# Patient Record
Sex: MALE | Race: WHITE | NOT HISPANIC OR LATINO | Employment: UNEMPLOYED | URBAN - METROPOLITAN AREA
[De-identification: names, ages, dates, MRNs, and addresses within clinical notes are randomized per-mention and may not be internally consistent; named-entity substitution may affect disease eponyms.]

---

## 2017-08-14 ENCOUNTER — HISTORICAL (OUTPATIENT)
Dept: RADIOLOGY | Facility: HOSPITAL | Age: 22
End: 2017-08-14

## 2022-08-10 ENCOUNTER — HOSPITAL ENCOUNTER (EMERGENCY)
Facility: HOSPITAL | Age: 27
Discharge: HOME | End: 2022-08-11
Attending: INTERNAL MEDICINE
Payer: MEDICAID

## 2022-08-10 DIAGNOSIS — N20.1 URETEROLITHIASIS: Primary | ICD-10-CM

## 2022-08-10 DIAGNOSIS — N20.0 KIDNEY STONE: ICD-10-CM

## 2022-08-10 LAB
ALBUMIN SERPL-MCNC: 4.1 GM/DL (ref 3.5–5)
ALBUMIN/GLOB SERPL: 1.2 RATIO (ref 1.1–2)
ALP SERPL-CCNC: 76 UNIT/L (ref 40–150)
ALT SERPL-CCNC: 31 UNIT/L (ref 0–55)
APPEARANCE UR: ABNORMAL
AST SERPL-CCNC: 18 UNIT/L (ref 5–34)
BACTERIA #/AREA URNS AUTO: ABNORMAL /HPF
BASOPHILS # BLD AUTO: 0.04 X10(3)/MCL (ref 0–0.2)
BASOPHILS NFR BLD AUTO: 0.3 %
BILIRUB UR QL STRIP.AUTO: NEGATIVE MG/DL
BILIRUBIN DIRECT+TOT PNL SERPL-MCNC: 0.3 MG/DL
BUN SERPL-MCNC: 18.2 MG/DL (ref 8.9–20.6)
CALCIUM SERPL-MCNC: 9.6 MG/DL (ref 8.4–10.2)
CHLORIDE SERPL-SCNC: 103 MMOL/L (ref 98–107)
CO2 SERPL-SCNC: 25 MMOL/L (ref 22–29)
COLOR UR AUTO: YELLOW
CREAT SERPL-MCNC: 1.06 MG/DL (ref 0.73–1.18)
EOSINOPHIL # BLD AUTO: 0.11 X10(3)/MCL (ref 0–0.9)
EOSINOPHIL NFR BLD AUTO: 0.9 %
ERYTHROCYTE [DISTWIDTH] IN BLOOD BY AUTOMATED COUNT: 14.2 % (ref 11.5–17)
GFR SERPLBLD CREATININE-BSD FMLA CKD-EPI: >60 MLS/MIN/1.73/M2
GLOBULIN SER-MCNC: 3.4 GM/DL (ref 2.4–3.5)
GLUCOSE SERPL-MCNC: 104 MG/DL (ref 74–100)
GLUCOSE UR QL STRIP.AUTO: NORMAL MG/DL
HCT VFR BLD AUTO: 43.8 % (ref 42–52)
HGB BLD-MCNC: 13.6 GM/DL (ref 14–18)
HYALINE CASTS #/AREA URNS LPF: ABNORMAL /LPF
IMM GRANULOCYTES # BLD AUTO: 0.03 X10(3)/MCL (ref 0–0.04)
IMM GRANULOCYTES NFR BLD AUTO: 0.2 %
KETONES UR QL STRIP.AUTO: ABNORMAL MG/DL
LEUKOCYTE ESTERASE UR QL STRIP.AUTO: NEGATIVE UNIT/L
LYMPHOCYTES # BLD AUTO: 3.45 X10(3)/MCL (ref 0.6–4.6)
LYMPHOCYTES NFR BLD AUTO: 28.3 %
MCH RBC QN AUTO: 26.5 PG (ref 27–31)
MCHC RBC AUTO-ENTMCNC: 31.1 MG/DL (ref 33–36)
MCV RBC AUTO: 85.4 FL (ref 80–94)
MONOCYTES # BLD AUTO: 1.1 X10(3)/MCL (ref 0.1–1.3)
MONOCYTES NFR BLD AUTO: 9 %
MUCOUS THREADS URNS QL MICRO: ABNORMAL /LPF
NEUTROPHILS # BLD AUTO: 7.4 X10(3)/MCL (ref 2.1–9.2)
NEUTROPHILS NFR BLD AUTO: 61.3 %
NITRITE UR QL STRIP.AUTO: NEGATIVE
NRBC BLD AUTO-RTO: 0 %
PH UR STRIP.AUTO: 5.5 [PH]
PLATELET # BLD AUTO: 376 X10(3)/MCL (ref 130–400)
PMV BLD AUTO: 11.1 FL (ref 7.4–10.4)
POTASSIUM SERPL-SCNC: 4 MMOL/L (ref 3.5–5.1)
PROT SERPL-MCNC: 7.5 GM/DL (ref 6.4–8.3)
PROT UR QL STRIP.AUTO: ABNORMAL MG/DL
RBC # BLD AUTO: 5.13 X10(6)/MCL (ref 4.7–6.1)
RBC UR QL AUTO: ABNORMAL UNIT/L
SODIUM SERPL-SCNC: 143 MMOL/L (ref 136–145)
SP GR UR STRIP.AUTO: 1.03
SQUAMOUS #/AREA URNS LPF: ABNORMAL /HPF
UNIDENT CRYS #/AREA URNS HPF: ABNORMAL /HPF
UROBILINOGEN UR STRIP-ACNC: NORMAL MG/DL
WBC # SPEC AUTO: 12.2 X10(3)/MCL (ref 4.5–11.5)
WBC #/AREA URNS AUTO: ABNORMAL /HPF

## 2022-08-10 PROCEDURE — 25000003 PHARM REV CODE 250: Performed by: INTERNAL MEDICINE

## 2022-08-10 PROCEDURE — 96365 THER/PROPH/DIAG IV INF INIT: CPT

## 2022-08-10 PROCEDURE — 63600175 PHARM REV CODE 636 W HCPCS: Performed by: INTERNAL MEDICINE

## 2022-08-10 PROCEDURE — 36415 COLL VENOUS BLD VENIPUNCTURE: CPT | Performed by: INTERNAL MEDICINE

## 2022-08-10 PROCEDURE — 85025 COMPLETE CBC W/AUTO DIFF WBC: CPT | Performed by: INTERNAL MEDICINE

## 2022-08-10 PROCEDURE — 99285 EMERGENCY DEPT VISIT HI MDM: CPT | Mod: 25

## 2022-08-10 PROCEDURE — 80053 COMPREHEN METABOLIC PANEL: CPT | Performed by: INTERNAL MEDICINE

## 2022-08-10 PROCEDURE — 81001 URINALYSIS AUTO W/SCOPE: CPT | Performed by: PHYSICIAN ASSISTANT

## 2022-08-10 PROCEDURE — 96361 HYDRATE IV INFUSION ADD-ON: CPT

## 2022-08-10 PROCEDURE — 96375 TX/PRO/DX INJ NEW DRUG ADDON: CPT

## 2022-08-10 RX ORDER — KETOROLAC TROMETHAMINE 30 MG/ML
30 INJECTION, SOLUTION INTRAMUSCULAR; INTRAVENOUS
Status: COMPLETED | OUTPATIENT
Start: 2022-08-10 | End: 2022-08-10

## 2022-08-10 RX ORDER — KETOROLAC TROMETHAMINE 30 MG/ML
30 INJECTION, SOLUTION INTRAMUSCULAR; INTRAVENOUS
Status: DISCONTINUED | OUTPATIENT
Start: 2022-08-10 | End: 2022-08-10

## 2022-08-10 RX ORDER — MORPHINE SULFATE 2 MG/ML
4 INJECTION, SOLUTION INTRAMUSCULAR; INTRAVENOUS
Status: COMPLETED | OUTPATIENT
Start: 2022-08-11 | End: 2022-08-10

## 2022-08-10 RX ORDER — TAMSULOSIN HYDROCHLORIDE 0.4 MG/1
0.4 CAPSULE ORAL
Status: COMPLETED | OUTPATIENT
Start: 2022-08-10 | End: 2022-08-10

## 2022-08-10 RX ADMIN — TAMSULOSIN HYDROCHLORIDE 0.4 MG: 0.4 CAPSULE ORAL at 10:08

## 2022-08-10 RX ADMIN — KETOROLAC TROMETHAMINE 30 MG: 30 INJECTION, SOLUTION INTRAMUSCULAR; INTRAVENOUS at 10:08

## 2022-08-10 RX ADMIN — MORPHINE SULFATE 4 MG: 2 INJECTION, SOLUTION INTRAMUSCULAR; INTRAVENOUS at 11:08

## 2022-08-10 RX ADMIN — SODIUM CHLORIDE, POTASSIUM CHLORIDE, SODIUM LACTATE AND CALCIUM CHLORIDE 1000 ML: 600; 310; 30; 20 INJECTION, SOLUTION INTRAVENOUS at 10:08

## 2022-08-10 RX ADMIN — PROMETHAZINE HYDROCHLORIDE 25 MG: 25 INJECTION INTRAMUSCULAR; INTRAVENOUS at 11:08

## 2022-08-11 VITALS
SYSTOLIC BLOOD PRESSURE: 125 MMHG | DIASTOLIC BLOOD PRESSURE: 89 MMHG | RESPIRATION RATE: 18 BRPM | OXYGEN SATURATION: 99 % | HEIGHT: 68 IN | TEMPERATURE: 98 F | WEIGHT: 304.25 LBS | BODY MASS INDEX: 46.11 KG/M2 | HEART RATE: 74 BPM

## 2022-08-11 PROCEDURE — 63600175 PHARM REV CODE 636 W HCPCS: Performed by: INTERNAL MEDICINE

## 2022-08-11 RX ORDER — HYDROCODONE BITARTRATE AND ACETAMINOPHEN 5; 325 MG/1; MG/1
1 TABLET ORAL EVERY 6 HOURS PRN
Qty: 12 TABLET | Refills: 0 | Status: SHIPPED | OUTPATIENT
Start: 2022-08-11 | End: 2023-03-17 | Stop reason: ALTCHOICE

## 2022-08-11 RX ORDER — TAMSULOSIN HYDROCHLORIDE 0.4 MG/1
0.4 CAPSULE ORAL DAILY
Qty: 14 CAPSULE | Refills: 0 | Status: SHIPPED | OUTPATIENT
Start: 2022-08-11 | End: 2023-03-17 | Stop reason: ALTCHOICE

## 2022-08-11 RX ORDER — SODIUM CHLORIDE, SODIUM LACTATE, POTASSIUM CHLORIDE, CALCIUM CHLORIDE 600; 310; 30; 20 MG/100ML; MG/100ML; MG/100ML; MG/100ML
500 INJECTION, SOLUTION INTRAVENOUS
Status: COMPLETED | OUTPATIENT
Start: 2022-08-11 | End: 2022-08-11

## 2022-08-11 RX ORDER — CIPROFLOXACIN 500 MG/1
500 TABLET ORAL 2 TIMES DAILY
Qty: 10 TABLET | Refills: 0 | Status: SHIPPED | OUTPATIENT
Start: 2022-08-11 | End: 2022-08-16

## 2022-08-11 RX ORDER — ONDANSETRON 4 MG/1
4 TABLET, ORALLY DISINTEGRATING ORAL EVERY 6 HOURS PRN
Qty: 15 TABLET | Refills: 0 | Status: SHIPPED | OUTPATIENT
Start: 2022-08-11 | End: 2023-03-17 | Stop reason: ALTCHOICE

## 2022-08-11 RX ORDER — MORPHINE SULFATE 2 MG/ML
4 INJECTION, SOLUTION INTRAMUSCULAR; INTRAVENOUS
Status: COMPLETED | OUTPATIENT
Start: 2022-08-11 | End: 2022-08-11

## 2022-08-11 RX ADMIN — MORPHINE SULFATE 4 MG: 2 INJECTION, SOLUTION INTRAMUSCULAR; INTRAVENOUS at 12:08

## 2022-08-11 RX ADMIN — SODIUM CHLORIDE, POTASSIUM CHLORIDE, SODIUM LACTATE AND CALCIUM CHLORIDE 500 ML: 600; 310; 30; 20 INJECTION, SOLUTION INTRAVENOUS at 12:08

## 2022-08-11 NOTE — ED PROVIDER NOTES
Encounter Date: 8/10/2022       History     Chief Complaint   Patient presents with    Flank Pain     Pt reports urinating last night 08/09/22, around 2100 hrs with sudden rt flank pain that is worsening. vss     Presents with Rt flank pain for the last 2 days. States initially he though was a muscular back pain but is worsening, now with nausea, vomiting and urinary hesitancy. Pain is sharp, moderate to severe, in Rt flank radiating to RLQ and back, associated to nausea and vomiting. Pt denies fever, hematuria, dysuria or prior episodes.    The history is provided by the patient.   Abdominal Pain  The current episode started two days ago. The onset of the illness was abrupt. The abdominal pain is located in the right flank. The abdominal pain radiates to the right flank, RLQ and back. The severity of the abdominal pain is 9/10. The abdominal pain is relieved by nothing. The other symptoms of the illness include nausea and vomiting. The other symptoms of the illness do not include fever, jaundice, shortness of breath, diarrhea or dysuria.   The patient has not had a change in bowel habit. Additional symptoms associated with the illness include back pain. Symptoms associated with the illness do not include diaphoresis, urgency or hematuria. Significant associated medical issues do not include GERD, inflammatory bowel disease, diabetes, gallstones or liver disease.     Review of patient's allergies indicates:   Allergen Reactions    Penicillins Rash     No past medical history on file.  No past surgical history on file.  No family history on file.  Social History     Tobacco Use    Smoking status: Current Every Day Smoker    Smokeless tobacco: Never Used     Review of Systems   Constitutional: Negative for diaphoresis and fever.   HENT: Negative for sore throat.    Respiratory: Negative for shortness of breath.    Cardiovascular: Negative for chest pain.   Gastrointestinal: Positive for abdominal pain, nausea and  vomiting. Negative for diarrhea and jaundice.   Genitourinary: Negative for dysuria, hematuria and urgency.   Musculoskeletal: Positive for back pain.   Skin: Negative for rash.   Neurological: Negative for weakness.   Hematological: Does not bruise/bleed easily.   All other systems reviewed and are negative.      Physical Exam     Initial Vitals [08/10/22 2205]   BP Pulse Resp Temp SpO2   (!) 130/96 81 (!) 22 98.3 °F (36.8 °C) 97 %      MAP       --         Physical Exam    Nursing note and vitals reviewed.  Constitutional: He appears well-developed and well-nourished. He appears distressed (In pain).   HENT:   Head: Normocephalic and atraumatic.   Mouth/Throat: Oropharynx is clear and moist.   Eyes: Conjunctivae are normal. Pupils are equal, round, and reactive to light.   Neck: Neck supple.   Normal range of motion.  Cardiovascular: Regular rhythm, normal heart sounds and intact distal pulses.   Pulmonary/Chest: Breath sounds normal. No respiratory distress.   Abdominal: Abdomen is soft. Bowel sounds are normal. He exhibits no distension. There is no abdominal tenderness.   Negative Gomez, negative McBurney There is no rebound and no guarding.   Genitourinary:    Genitourinary Comments: CVT Rt side     Musculoskeletal:         General: No edema. Normal range of motion.      Cervical back: Normal range of motion and neck supple.     Neurological: He is alert and oriented to person, place, and time. He has normal strength. GCS score is 15. GCS eye subscore is 4. GCS verbal subscore is 5. GCS motor subscore is 6.   Skin: Skin is warm and dry. No rash noted.   Psychiatric: His behavior is normal.         ED Course   ED US LTD Renal/Bladder    Date/Time: 8/10/2022 10:35 PM  Performed by: Juan Mary MD  Authorized by: Juan Mary MD     Indication:  Flank pain  Identified Structures:  Right kidney transverse and Right kidney longitudinal  Hydronephrosis:  Present  degree of  hydronephrosis:  Mild  Impression:  Hydronephrosis  Charge?:  No (educational)      Labs Reviewed   COMPREHENSIVE METABOLIC PANEL - Abnormal; Notable for the following components:       Result Value    Glucose Level 104 (*)     All other components within normal limits   CBC WITH DIFFERENTIAL - Abnormal; Notable for the following components:    WBC 12.2 (*)     Hgb 13.6 (*)     MCH 26.5 (*)     MCHC 31.1 (*)     MPV 11.1 (*)     All other components within normal limits   URINALYSIS, REFLEX TO URINE CULTURE - Abnormal; Notable for the following components:    Appearance, UA Turbid (*)     Protein, UA 1+ (*)     Ketones, UA Trace (*)     Blood, UA 3+ (*)     WBC, UA 6-10 (*)     Squamous Epithelial Cells, UA Trace (*)     Mucous, UA Many (*)     Unclassified Crystal, UA Occ (*)     All other components within normal limits   CBC W/ AUTO DIFFERENTIAL    Narrative:     The following orders were created for panel order CBC auto differential.  Procedure                               Abnormality         Status                     ---------                               -----------         ------                     CBC with Differential[[id number]]        Abnormal            Final result                 Please view results for these tests on the individual orders.          Imaging Results          CT Abdomen Pelvis  Without Contrast (Preliminary result)  Result time 08/10/22 23:26:37    Preliminary result by Interface, Rad Results In (08/10/22 23:26:37)                 Narrative:    START OF REPORT:  Technique: CT of the abdomen and pelvis was performed with axial images as well as sagittal and coronal reconstruction images without intravenous contrast.    Comparison: Comparison is with study dated. 2017-06-15 05:24:30.    Clinical History: Pt reports urinating last night 08/09/22, around 2100 hrs with sudden rt flank pain that is worsening. vss.    Dosage Information: Automated Exposure Control was  utilized.    Findings:  Lines and Tubes: None.  Thorax:  Lungs: The visualized lung bases appear unremarkable.  Pleura: No effusions or thickening. No pneumothorax is seen.  Heart: The heart size is within normal limits.  Abdomen:  Abdominal Wall: No abdominal wall pathology is seen.  Liver: The liver appears unremarkable.  Biliary System: No intrahepatic or extrahepatic biliary duct dilatation is seen.  Gallbladder: The gallbladder appears unremarkable.  Pancreas: The pancreas appears unremarkable.  Spleen: The spleen appears unremarkable.  Adrenals: The adrenal glands appear unremarkable.  Kidneys: The left kidney appears unremarkable with no stones cysts masses or hydronephrosis. A calculus measuring 3.4 mm is seen in the right proximal ureter (Series 2 image 53) causing mild hydroureteronephrosis.  Bowel:  Esophagus: The visualized esophagus appears unremarkable.  Stomach: The stomach appears unremarkable.  Duodenum: Unremarkable appearing duodenum.  Small Bowel: The small bowel appears unremarkable.  Colon: Nondistended.  Appendix: The appendix appears unremarkable.  Peritoneum: No intraperitoneal free air or ascites is seen.    Pelvis:  Bladder: The bladder is nondistended and cannot be definitively evaluated.  Male:  Prostate gland: The prostate gland appears unremarkable.    Bony structures:  Dorsal Spine: The visualized dorsal spine appears unremarkable.  Bony Pelvis: The visualized bony structures of the pelvis appear unremarkable.      Impression:  1. A calculus measuring 3.4 mm is seen in the right proximal ureter (Series 2 image 53) causing mild hydroureteronephrosis.                      Preliminary result by Liam Courtney Jr., MD (08/10/22 23:26:37)                 Narrative:    START OF REPORT:  Technique: CT of the abdomen and pelvis was performed with axial images as well as sagittal and coronal reconstruction images without intravenous contrast.    Comparison: Comparison is with study  dated. 2017-06-15 05:24:30.    Clinical History: Pt reports urinating last night 08/09/22, around 2100 hrs with sudden rt flank pain that is worsening. vss.    Dosage Information: Automated Exposure Control was utilized.    Findings:  Lines and Tubes: None.  Thorax:  Lungs: The visualized lung bases appear unremarkable.  Pleura: No effusions or thickening. No pneumothorax is seen.  Heart: The heart size is within normal limits.  Abdomen:  Abdominal Wall: No abdominal wall pathology is seen.  Liver: The liver appears unremarkable.  Biliary System: No intrahepatic or extrahepatic biliary duct dilatation is seen.  Gallbladder: The gallbladder appears unremarkable.  Pancreas: The pancreas appears unremarkable.  Spleen: The spleen appears unremarkable.  Adrenals: The adrenal glands appear unremarkable.  Kidneys: The left kidney appears unremarkable with no stones cysts masses or hydronephrosis. A calculus measuring 3.4 mm is seen in the right proximal ureter (Series 2 image 53) causing mild hydroureteronephrosis.  Bowel:  Esophagus: The visualized esophagus appears unremarkable.  Stomach: The stomach appears unremarkable.  Duodenum: Unremarkable appearing duodenum.  Small Bowel: The small bowel appears unremarkable.  Colon: Nondistended.  Appendix: The appendix appears unremarkable.  Peritoneum: No intraperitoneal free air or ascites is seen.    Pelvis:  Bladder: The bladder is nondistended and cannot be definitively evaluated.  Male:  Prostate gland: The prostate gland appears unremarkable.    Bony structures:  Dorsal Spine: The visualized dorsal spine appears unremarkable.  Bony Pelvis: The visualized bony structures of the pelvis appear unremarkable.      Impression:  1. A calculus measuring 3.4 mm is seen in the right proximal ureter (Series 2 image 53) causing mild hydroureteronephrosis.                                   Medications   lactated ringers bolus 1,000 mL (0 mLs Intravenous Stopped 8/10/22 2361)    ketorolac injection 30 mg (30 mg Intravenous Given 8/10/22 2255)   tamsulosin 24 hr capsule 0.4 mg (0.4 mg Oral Given 8/10/22 2256)   promethazine (PHENERGAN) 25 mg in dextrose 5 % 50 mL IVPB (0 mg Intravenous Stopped 8/11/22 0017)   morphine injection 4 mg (4 mg Intravenous Given 8/10/22 2359)   lactated ringers infusion (500 mLs Intravenous New Bag 8/11/22 0025)   morphine injection 4 mg (4 mg Intravenous Given 8/11/22 0026)          Reassessed pt at this time. Pt states his condition has improved at this time. Discussed with pt all pertinent ED information and results. Discussed pt dx of ureteral stone Rt side and plan of tx. Gave pt all f/u and return to the ED instructions. All questions and concerns were addressed at this time. Pt expresses understanding of information and instructions, and is comfortable with plan to discharge. Pt is stable for discharge.                      Clinical Impression:   Final diagnoses:  [N20.1] Ureterolithiasis (Primary)  [N20.0] Kidney stone          ED Disposition Condition    Discharge Stable        ED Prescriptions     Medication Sig Dispense Start Date End Date Auth. Provider    tamsulosin (FLOMAX) 0.4 mg Cap Take 1 capsule (0.4 mg total) by mouth once daily. for 14 days 14 capsule 8/11/2022 8/25/2022 Juan Mary MD    ondansetron (ZOFRAN-ODT) 4 MG TbDL Take 1 tablet (4 mg total) by mouth every 6 (six) hours as needed (nausea or vomiting). 15 tablet 8/11/2022  Juan Mary MD    ciprofloxacin HCl (CIPRO) 500 MG tablet Take 1 tablet (500 mg total) by mouth 2 (two) times daily. for 5 days 10 tablet 8/11/2022 8/16/2022 Juan Mary MD    HYDROcodone-acetaminophen (NORCO) 5-325 mg per tablet Take 1 tablet by mouth every 6 (six) hours as needed for Pain. 12 tablet 8/11/2022  Juan Mary MD        Follow-up Information     Follow up With Specialties Details Why Contact Info    [facility] Emergency Dept  Emergency Medicine  If symptoms worsen [address] [facility] [address] [phone]    [facility]  In 3 months  [address] [id number] [phone]    [facility] Urology Urology In 1 month  [address] [facility] [id number] [phone]           Juan Mary MD  08/11/22 0200

## 2022-09-26 ENCOUNTER — OFFICE VISIT (OUTPATIENT)
Dept: UROLOGY | Facility: CLINIC | Age: 27
End: 2022-09-26
Payer: MEDICAID

## 2022-09-26 VITALS
SYSTOLIC BLOOD PRESSURE: 122 MMHG | OXYGEN SATURATION: 98 % | HEIGHT: 68 IN | WEIGHT: 304.25 LBS | DIASTOLIC BLOOD PRESSURE: 85 MMHG | BODY MASS INDEX: 46.11 KG/M2 | RESPIRATION RATE: 18 BRPM | HEART RATE: 62 BPM | TEMPERATURE: 98 F

## 2022-09-26 DIAGNOSIS — N20.1 URETEROLITHIASIS: ICD-10-CM

## 2022-09-26 PROCEDURE — 3008F BODY MASS INDEX DOCD: CPT | Mod: CPTII,,, | Performed by: NURSE PRACTITIONER

## 2022-09-26 PROCEDURE — 1159F MED LIST DOCD IN RCRD: CPT | Mod: CPTII,,, | Performed by: NURSE PRACTITIONER

## 2022-09-26 PROCEDURE — 99214 OFFICE O/P EST MOD 30 MIN: CPT | Mod: PBBFAC | Performed by: NURSE PRACTITIONER

## 2022-09-26 PROCEDURE — 1160F RVW MEDS BY RX/DR IN RCRD: CPT | Mod: CPTII,,, | Performed by: NURSE PRACTITIONER

## 2022-09-26 PROCEDURE — 3079F DIAST BP 80-89 MM HG: CPT | Mod: CPTII,,, | Performed by: NURSE PRACTITIONER

## 2022-09-26 PROCEDURE — 3074F PR MOST RECENT SYSTOLIC BLOOD PRESSURE < 130 MM HG: ICD-10-PCS | Mod: CPTII,,, | Performed by: NURSE PRACTITIONER

## 2022-09-26 PROCEDURE — 3074F SYST BP LT 130 MM HG: CPT | Mod: CPTII,,, | Performed by: NURSE PRACTITIONER

## 2022-09-26 PROCEDURE — 99214 PR OFFICE/OUTPT VISIT, EST, LEVL IV, 30-39 MIN: ICD-10-PCS | Mod: S$PBB,,, | Performed by: NURSE PRACTITIONER

## 2022-09-26 PROCEDURE — 3008F PR BODY MASS INDEX (BMI) DOCUMENTED: ICD-10-PCS | Mod: CPTII,,, | Performed by: NURSE PRACTITIONER

## 2022-09-26 PROCEDURE — 99214 OFFICE O/P EST MOD 30 MIN: CPT | Mod: S$PBB,,, | Performed by: NURSE PRACTITIONER

## 2022-09-26 PROCEDURE — 1159F PR MEDICATION LIST DOCUMENTED IN MEDICAL RECORD: ICD-10-PCS | Mod: CPTII,,, | Performed by: NURSE PRACTITIONER

## 2022-09-26 PROCEDURE — 1160F PR REVIEW ALL MEDS BY PRESCRIBER/CLIN PHARMACIST DOCUMENTED: ICD-10-PCS | Mod: CPTII,,, | Performed by: NURSE PRACTITIONER

## 2022-09-26 PROCEDURE — 3079F PR MOST RECENT DIASTOLIC BLOOD PRESSURE 80-89 MM HG: ICD-10-PCS | Mod: CPTII,,, | Performed by: NURSE PRACTITIONER

## 2022-09-26 NOTE — PROGRESS NOTES
Pt seen by Raad NEGRON. Orders will be placed for US to be done. RTC 6 months with ADRIANE. Pt education given both written and verbal.

## 2022-09-26 NOTE — PROGRESS NOTES
Chief Complaint:   Chief Complaint   Patient presents with    Referral 08/11/2022 for Ureterolithiasis       HPI:  Patient is a 27-year-old male referred to Urology for kidney stones.  Patient seen in the ED on 08/10/2022 for right flank pain.  Patient states he passed stone but did not keep to send for analysis.  Patient denies flank pain, groin pain, lower abdominal pain, urinary frequency, urgency, incontinence, retention, dysuria, gross hematuria.  Denies family history of stones, or urology pathology.     .      Allergies:  Review of patient's allergies indicates:   Allergen Reactions    Penicillins Rash       Medications:  Current Outpatient Medications   Medication Sig Dispense Refill    HYDROcodone-acetaminophen (NORCO) 5-325 mg per tablet Take 1 tablet by mouth every 6 (six) hours as needed for Pain. (Patient not taking: Reported on 9/26/2022) 12 tablet 0    ondansetron (ZOFRAN-ODT) 4 MG TbDL Take 1 tablet (4 mg total) by mouth every 6 (six) hours as needed (nausea or vomiting). (Patient not taking: Reported on 9/26/2022) 15 tablet 0    tamsulosin (FLOMAX) 0.4 mg Cap Take 1 capsule (0.4 mg total) by mouth once daily. for 14 days 14 capsule 0     No current facility-administered medications for this visit.       Review of Systems:  General: No fever, chills, fatigability, or weight loss.  Skin: No rashes, itching, or changes in color or texture of skin.  Chest: Denies HERNANDES, cyanosis, wheezing, cough, and sputum production.  Abdomen: Appetite fine. No weight loss. Denies diarrhea, abdominal pain, hematemesis, or blood in stool.  Musculoskeletal: No joint stiffness or swelling. Denies back pain.  : As above.  All other review of systems negative.    PMH:  Past Medical History:   Diagnosis Date    Asthma     Heart murmur        PSH:  No past surgical history on file.    FamHx:  No family history on file.    SocHx:  Social History     Socioeconomic History    Marital status: Single   Tobacco Use    Smoking  status: Never    Smokeless tobacco: Never       Physical Exam:  Vitals:    09/26/22 1115   BP: 122/85   Pulse: 62   Resp: 18   Temp: 98.4 °F (36.9 °C)     General: A&Ox3, no apparent distress, no deformities  Neck: No masses, normal thyroid  Lungs: CTA geovany, no use of accessory muscles  Heart: RRR, no arrhythmias  Abdomen: Soft, NT, ND, no masses, no hernias, no hepatosplenomegaly  Lymphatic: Neck and groin nodes negative  Skin: The skin is warm and dry. No jaundice.  Ext: No c/c/e.            Imaging: CT Abd.Pelvis 08/10/2022: A calculus measuring 3.4 mm is seen in the right proximal ureter (Series 2 image 53) causing mild hydroureteronephrosis.      Impression: Utererolithiasis      Plan: RTC 6 months with a KUB, Renal US now to evaluate Hydronephrosis, will notify patient of results. . Increase fluid intake, limit salt in diet, limit protein to 30%, intake adequate calcium, increase brand, soy, Beets, nuts. Instructed patient on Avoiding bladder irritants, such as alcohol, citrus drinks or foods,salty foods, energy drinks, spicy foods, caffeine, sodas

## 2022-12-16 ENCOUNTER — HOSPITAL ENCOUNTER (OUTPATIENT)
Dept: RADIOLOGY | Facility: HOSPITAL | Age: 27
Discharge: HOME | End: 2022-12-16
Attending: NURSE PRACTITIONER
Payer: MEDICAID

## 2022-12-16 DIAGNOSIS — N20.1 URETEROLITHIASIS: Primary | ICD-10-CM

## 2022-12-16 DIAGNOSIS — N20.0 KIDNEY STONES: ICD-10-CM

## 2022-12-16 DIAGNOSIS — N20.1 URETEROLITHIASIS: ICD-10-CM

## 2022-12-16 PROCEDURE — 74018 RADEX ABDOMEN 1 VIEW: CPT | Mod: TC

## 2023-03-17 ENCOUNTER — HOSPITAL ENCOUNTER (EMERGENCY)
Facility: HOSPITAL | Age: 28
Discharge: HOME | End: 2023-03-17
Attending: FAMILY MEDICINE
Payer: MEDICAID

## 2023-03-17 VITALS
HEART RATE: 73 BPM | DIASTOLIC BLOOD PRESSURE: 94 MMHG | TEMPERATURE: 99 F | BODY MASS INDEX: 49.6 KG/M2 | HEIGHT: 66 IN | OXYGEN SATURATION: 98 % | SYSTOLIC BLOOD PRESSURE: 158 MMHG | WEIGHT: 308.63 LBS | RESPIRATION RATE: 20 BRPM

## 2023-03-17 DIAGNOSIS — K29.00 ACUTE GASTRITIS WITHOUT HEMORRHAGE, UNSPECIFIED GASTRITIS TYPE: Primary | ICD-10-CM

## 2023-03-17 LAB
ALBUMIN SERPL-MCNC: 4.4 G/DL (ref 3.5–5)
ALBUMIN/GLOB SERPL: 1.1 RATIO (ref 1.1–2)
ALP SERPL-CCNC: 76 UNIT/L (ref 40–150)
ALT SERPL-CCNC: 27 UNIT/L (ref 0–55)
AST SERPL-CCNC: 19 UNIT/L (ref 5–34)
BASOPHILS # BLD AUTO: 0.04 X10(3)/MCL (ref 0–0.2)
BASOPHILS NFR BLD AUTO: 0.3 %
BILIRUBIN DIRECT+TOT PNL SERPL-MCNC: 0.3 MG/DL
BUN SERPL-MCNC: 15.2 MG/DL (ref 8.9–20.6)
CALCIUM SERPL-MCNC: 10.2 MG/DL (ref 8.4–10.2)
CHLORIDE SERPL-SCNC: 101 MMOL/L (ref 98–107)
CO2 SERPL-SCNC: 26 MMOL/L (ref 22–29)
CREAT SERPL-MCNC: 1.12 MG/DL (ref 0.73–1.18)
EOSINOPHIL # BLD AUTO: 0.05 X10(3)/MCL (ref 0–0.9)
EOSINOPHIL NFR BLD AUTO: 0.4 %
ERYTHROCYTE [DISTWIDTH] IN BLOOD BY AUTOMATED COUNT: 14.6 % (ref 11.5–17)
GFR SERPLBLD CREATININE-BSD FMLA CKD-EPI: >60 MLS/MIN/1.73/M2
GLOBULIN SER-MCNC: 3.9 GM/DL (ref 2.4–3.5)
GLUCOSE SERPL-MCNC: 94 MG/DL (ref 74–100)
HCT VFR BLD AUTO: 43.9 % (ref 42–52)
HGB BLD-MCNC: 14.1 G/DL (ref 14–18)
IMM GRANULOCYTES # BLD AUTO: 0.04 X10(3)/MCL (ref 0–0.04)
IMM GRANULOCYTES NFR BLD AUTO: 0.3 %
LIPASE SERPL-CCNC: 15 U/L
LYMPHOCYTES # BLD AUTO: 1.9 X10(3)/MCL (ref 0.6–4.6)
LYMPHOCYTES NFR BLD AUTO: 16.3 %
MAGNESIUM SERPL-MCNC: 2.1 MG/DL (ref 1.6–2.6)
MCH RBC QN AUTO: 26.8 PG
MCHC RBC AUTO-ENTMCNC: 32.1 G/DL (ref 33–36)
MCV RBC AUTO: 83.3 FL (ref 80–94)
MONOCYTES # BLD AUTO: 0.81 X10(3)/MCL (ref 0.1–1.3)
MONOCYTES NFR BLD AUTO: 6.9 %
NEUTROPHILS # BLD AUTO: 8.84 X10(3)/MCL (ref 2.1–9.2)
NEUTROPHILS NFR BLD AUTO: 75.8 %
NRBC BLD AUTO-RTO: 0 %
PLATELET # BLD AUTO: 351 X10(3)/MCL (ref 130–400)
PMV BLD AUTO: 11.2 FL (ref 7.4–10.4)
POTASSIUM SERPL-SCNC: 4 MMOL/L (ref 3.5–5.1)
PROT SERPL-MCNC: 8.3 GM/DL (ref 6.4–8.3)
RBC # BLD AUTO: 5.27 X10(6)/MCL (ref 4.7–6.1)
SODIUM SERPL-SCNC: 138 MMOL/L (ref 136–145)
WBC # SPEC AUTO: 11.7 X10(3)/MCL (ref 4.5–11.5)

## 2023-03-17 PROCEDURE — 83690 ASSAY OF LIPASE: CPT | Performed by: FAMILY MEDICINE

## 2023-03-17 PROCEDURE — 99284 EMERGENCY DEPT VISIT MOD MDM: CPT

## 2023-03-17 PROCEDURE — 80053 COMPREHEN METABOLIC PANEL: CPT | Performed by: FAMILY MEDICINE

## 2023-03-17 PROCEDURE — 83735 ASSAY OF MAGNESIUM: CPT | Performed by: FAMILY MEDICINE

## 2023-03-17 PROCEDURE — 85025 COMPLETE CBC W/AUTO DIFF WBC: CPT | Performed by: FAMILY MEDICINE

## 2023-03-17 PROCEDURE — 25000003 PHARM REV CODE 250: Performed by: FAMILY MEDICINE

## 2023-03-17 RX ORDER — ONDANSETRON 4 MG/1
4 TABLET, ORALLY DISINTEGRATING ORAL EVERY 6 HOURS PRN
Qty: 10 TABLET | Refills: 0 | Status: SHIPPED | OUTPATIENT
Start: 2023-03-17 | End: 2023-03-22

## 2023-03-17 RX ORDER — MAG HYDROX/ALUMINUM HYD/SIMETH 200-200-20
30 SUSPENSION, ORAL (FINAL DOSE FORM) ORAL
Status: COMPLETED | OUTPATIENT
Start: 2023-03-17 | End: 2023-03-17

## 2023-03-17 RX ORDER — DICYCLOMINE HYDROCHLORIDE 10 MG/1
10 CAPSULE ORAL EVERY 6 HOURS PRN
Qty: 20 CAPSULE | Refills: 0 | Status: SHIPPED | OUTPATIENT
Start: 2023-03-17 | End: 2023-03-27 | Stop reason: SDUPTHER

## 2023-03-17 RX ORDER — FAMOTIDINE 20 MG/1
20 TABLET, FILM COATED ORAL 2 TIMES DAILY
Qty: 20 TABLET | Refills: 0 | Status: SHIPPED | OUTPATIENT
Start: 2023-03-17 | End: 2023-11-08 | Stop reason: ALTCHOICE

## 2023-03-17 RX ORDER — LIDOCAINE HYDROCHLORIDE 20 MG/ML
10 SOLUTION OROPHARYNGEAL
Status: COMPLETED | OUTPATIENT
Start: 2023-03-17 | End: 2023-03-17

## 2023-03-17 RX ADMIN — LIDOCAINE HYDROCHLORIDE 10 ML: 20 SOLUTION ORAL; TOPICAL at 03:03

## 2023-03-17 RX ADMIN — ALUMINUM HYDROXIDE, MAGNESIUM HYDROXIDE, AND SIMETHICONE 30 ML: 200; 200; 20 SUSPENSION ORAL at 03:03

## 2023-03-17 NOTE — ED PROVIDER NOTES
"Encounter Date: 3/17/2023       History     Chief Complaint   Patient presents with    Abdominal Pain     28-year-old gentleman presents emergency room complaints of right upper quadrant abdominal pain that occurred approximately an hour ago while he was taking a shower.  Patient reports severe pain occurring, feeling like a "Charley horse", was sharp like pain in the right upper quadrant radiating to the right flank.  Patient reports associated nausea.  Patient felt as if he was having a panic attack as well when having symptoms.  Denies chest pain.  Patient does have symptoms of epigastric abdominal discomfort radiation into the chest-reports increased symptoms of gastroesophageal reflux recently.  Patient denies any chronic medical problems.  Denies any prior surgeries.    The history is provided by the patient.   Review of patient's allergies indicates:   Allergen Reactions    Penicillins Rash     Past Medical History:   Diagnosis Date    Asthma     Heart murmur      History reviewed. No pertinent surgical history.  History reviewed. No pertinent family history.  Social History     Tobacco Use    Smoking status: Never    Smokeless tobacco: Never     Review of Systems   Constitutional:  Negative for chills, fatigue and fever.   HENT:  Negative for ear pain, rhinorrhea and sore throat.    Eyes:  Negative for photophobia and pain.   Respiratory:  Negative for cough, shortness of breath and wheezing.    Cardiovascular:  Negative for chest pain.   Gastrointestinal:  Positive for abdominal pain and nausea. Negative for diarrhea and vomiting.   Genitourinary:  Negative for dysuria.   Neurological:  Negative for dizziness, weakness and headaches.   All other systems reviewed and are negative.    Physical Exam     Initial Vitals [03/17/23 0251]   BP Pulse Resp Temp SpO2   (!) 158/94 73 20 98.6 °F (37 °C) 98 %      MAP       --         Physical Exam    Nursing note and vitals reviewed.  Constitutional: He appears " well-developed and well-nourished.   HENT:   Head: Normocephalic and atraumatic.   Eyes: EOM are normal. Pupils are equal, round, and reactive to light.   Neck: Neck supple.   Normal range of motion.  Cardiovascular:  Normal rate, regular rhythm, normal heart sounds and intact distal pulses.     Exam reveals no gallop and no friction rub.       No murmur heard.  Pulmonary/Chest: Breath sounds normal. No respiratory distress.   Abdominal: Abdomen is soft. Bowel sounds are normal. He exhibits no distension. There is abdominal tenderness.   Mild epigastric arrival upper quadrant abdominal tenderness.  Negative Gomez sign.   Musculoskeletal:         General: Normal range of motion.      Cervical back: Normal range of motion and neck supple.     Neurological: He is alert and oriented to person, place, and time. He has normal strength.   Skin: Skin is warm and dry.   Psychiatric: He has a normal mood and affect. His behavior is normal. Judgment and thought content normal.       ED Course   Procedures  Labs Reviewed   COMPREHENSIVE METABOLIC PANEL - Abnormal; Notable for the following components:       Result Value    Globulin 3.9 (*)     All other components within normal limits   CBC WITH DIFFERENTIAL - Abnormal; Notable for the following components:    WBC 11.7 (*)     MCHC 32.1 (*)     MPV 11.2 (*)     All other components within normal limits   MAGNESIUM - Normal   LIPASE - Normal   CBC W/ AUTO DIFFERENTIAL    Narrative:     The following orders were created for panel order CBC Auto Differential.  Procedure                               Abnormality         Status                     ---------                               -----------         ------                     CBC with Differential[[id number]]        Abnormal            Final result                 Please view results for these tests on the individual orders.   EXTRA TUBES    Narrative:     The following orders were created for panel order EXTRA  TUBES.  Procedure                               Abnormality         Status                     ---------                               -----------         ------                     Red Top Hold[[id number]]                                                                  Please view results for these tests on the individual orders.   RED TOP HOLD          Imaging Results    None          Medications   LIDOcaine HCl 2% oral solution 10 mL (10 mLs Oral Given 3/17/23 0305)   aluminum-magnesium hydroxide-simethicone 200-200-20 mg/5 mL suspension 30 mL (30 mLs Oral Given 3/17/23 0305)     Medical Decision Making:   Initial Assessment:   Patient presenting with acute onset of right upper quadrant and flank pain.  Currently feels much improved than when symptoms initially began.  Will obtain laboratory evaluation including CBC, CMP, lipase evaluate for elevated liver enzymes/elevated lipase to evaluate for gallbladder dysfunction versus pancreatitis.  Given patient's history of gastroesophageal reflux, symptoms may be related to gastritis.  Will treat symptomatically and continue to monitor.  Differential Diagnosis:   Epigastric abdominal pain, acute gastritis, acute pancreatitis, acute cholecystitis, ureterolithiasis           ED Course as of 03/17/23 0407   Fri Mar 17, 2023   0322 WBC(!): 11.7 [MW]   0322 Hemoglobin: 14.1 [MW]   0322 Hematocrit: 43.9 [MW]   0322 Platelets: 351 [MW]   0350 Lipase: 15 [MW]   0350 Magnesium: 2.10 [MW]   0350 Sodium: 138 [MW]   0350 Potassium: 4.0 [MW]   0350 Chloride: 101 [MW]   0350 CO2: 26 [MW]   0350 Glucose: 94 [MW]   0350 BUN: 15.2 [MW]   0350 Creatinine: 1.12 [MW]   0350 Albumin: 4.4 [MW]   0350 BILIRUBIN TOTAL: 0.3 [MW]   0350 Alkaline Phosphatase: 76 [MW]   0351 ALT: 27 [MW]   0351 AST: 19 [MW]   0351 eGFR: >60  Patient feeling improved; bedside ultrasound performed - no acute stones noted within gallbladder; no pericholecystic fluid. [MW]      ED Course User Index  [MW] Joel PENALOZA  MD Jacquelin                 Clinical Impression:   Final diagnoses:  [K29.00] Acute gastritis without hemorrhage, unspecified gastritis type (Primary)        ED Disposition Condition    Discharge Stable          ED Prescriptions       Medication Sig Dispense Start Date End Date Auth. Provider    ondansetron (ZOFRAN-ODT) 4 MG TbDL Take 1 tablet (4 mg total) by mouth every 6 (six) hours as needed (nausea vomiting). 10 tablet 3/17/2023 3/22/2023 Joel Roper MD    dicyclomine (BENTYL) 10 MG capsule Take 1 capsule (10 mg total) by mouth every 6 (six) hours as needed (abdominal cramping). 20 capsule 3/17/2023 3/27/2023 Joel Roper MD    famotidine (PEPCID) 20 MG tablet Take 1 tablet (20 mg total) by mouth 2 (two) times daily. for 10 days 20 tablet 3/17/2023 3/27/2023 Joel Roper MD          Follow-up Information       Follow up With Specialties Details Why Contact Info [facility] Emergency Medicine  As needed, If symptoms worsen [address] [facility] [address] [phone]    Primary Care Physician  In 5 days               Joel Roper MD  03/17/23 2052

## 2023-03-22 ENCOUNTER — TELEPHONE (OUTPATIENT)
Dept: UROLOGY | Facility: CLINIC | Age: 28
End: 2023-03-22
Payer: MEDICAID

## 2023-03-22 NOTE — TELEPHONE ENCOUNTER
Patient called to confirm appointment for Urology on 03/27/2023. Patient confirmed appointment. Patient informed of imaging that needs to be done before appointment. Voiced understanding.

## 2023-03-27 ENCOUNTER — OFFICE VISIT (OUTPATIENT)
Dept: UROLOGY | Facility: CLINIC | Age: 28
End: 2023-03-27
Payer: MEDICAID

## 2023-03-27 ENCOUNTER — HOSPITAL ENCOUNTER (OUTPATIENT)
Dept: RADIOLOGY | Facility: HOSPITAL | Age: 28
Discharge: HOME | End: 2023-03-27
Attending: NURSE PRACTITIONER
Payer: MEDICAID

## 2023-03-27 VITALS
RESPIRATION RATE: 20 BRPM | OXYGEN SATURATION: 98 % | TEMPERATURE: 98 F | WEIGHT: 307.75 LBS | HEART RATE: 54 BPM | SYSTOLIC BLOOD PRESSURE: 126 MMHG | HEIGHT: 65 IN | BODY MASS INDEX: 51.27 KG/M2 | DIASTOLIC BLOOD PRESSURE: 70 MMHG

## 2023-03-27 DIAGNOSIS — N20.0 KIDNEY STONES: ICD-10-CM

## 2023-03-27 DIAGNOSIS — N20.1 URETEROLITHIASIS: Primary | ICD-10-CM

## 2023-03-27 DIAGNOSIS — R10.9 ABDOMINAL SPASMS: ICD-10-CM

## 2023-03-27 DIAGNOSIS — N20.1 URETEROLITHIASIS: ICD-10-CM

## 2023-03-27 PROCEDURE — 99213 OFFICE O/P EST LOW 20 MIN: CPT | Mod: S$PBB,,, | Performed by: NURSE PRACTITIONER

## 2023-03-27 PROCEDURE — 3078F PR MOST RECENT DIASTOLIC BLOOD PRESSURE < 80 MM HG: ICD-10-PCS | Mod: CPTII,,, | Performed by: NURSE PRACTITIONER

## 2023-03-27 PROCEDURE — 99214 OFFICE O/P EST MOD 30 MIN: CPT | Mod: PBBFAC | Performed by: NURSE PRACTITIONER

## 2023-03-27 PROCEDURE — 3008F BODY MASS INDEX DOCD: CPT | Mod: CPTII,,, | Performed by: NURSE PRACTITIONER

## 2023-03-27 PROCEDURE — 3074F SYST BP LT 130 MM HG: CPT | Mod: CPTII,,, | Performed by: NURSE PRACTITIONER

## 2023-03-27 PROCEDURE — 3008F PR BODY MASS INDEX (BMI) DOCUMENTED: ICD-10-PCS | Mod: CPTII,,, | Performed by: NURSE PRACTITIONER

## 2023-03-27 PROCEDURE — 1159F PR MEDICATION LIST DOCUMENTED IN MEDICAL RECORD: ICD-10-PCS | Mod: CPTII,,, | Performed by: NURSE PRACTITIONER

## 2023-03-27 PROCEDURE — 1160F PR REVIEW ALL MEDS BY PRESCRIBER/CLIN PHARMACIST DOCUMENTED: ICD-10-PCS | Mod: CPTII,,, | Performed by: NURSE PRACTITIONER

## 2023-03-27 PROCEDURE — 99213 PR OFFICE/OUTPT VISIT, EST, LEVL III, 20-29 MIN: ICD-10-PCS | Mod: S$PBB,,, | Performed by: NURSE PRACTITIONER

## 2023-03-27 PROCEDURE — 3074F PR MOST RECENT SYSTOLIC BLOOD PRESSURE < 130 MM HG: ICD-10-PCS | Mod: CPTII,,, | Performed by: NURSE PRACTITIONER

## 2023-03-27 PROCEDURE — 1160F RVW MEDS BY RX/DR IN RCRD: CPT | Mod: CPTII,,, | Performed by: NURSE PRACTITIONER

## 2023-03-27 PROCEDURE — 3078F DIAST BP <80 MM HG: CPT | Mod: CPTII,,, | Performed by: NURSE PRACTITIONER

## 2023-03-27 PROCEDURE — 74018 RADEX ABDOMEN 1 VIEW: CPT | Mod: TC

## 2023-03-27 PROCEDURE — 1159F MED LIST DOCD IN RCRD: CPT | Mod: CPTII,,, | Performed by: NURSE PRACTITIONER

## 2023-03-27 RX ORDER — DICYCLOMINE HYDROCHLORIDE 10 MG/1
10 CAPSULE ORAL EVERY 6 HOURS PRN
Qty: 20 CAPSULE | Refills: 0 | Status: SHIPPED | OUTPATIENT
Start: 2023-03-27 | End: 2023-04-06

## 2023-03-27 RX ORDER — ONDANSETRON 4 MG/1
8 TABLET, FILM COATED ORAL 2 TIMES DAILY
COMMUNITY

## 2023-03-27 NOTE — PROGRESS NOTES
Chief Complaint: No chief complaint on file.      HPI: Patient is a 27-year-old male for six-month follow-up for kidney stones.  Patient seen in the ED on 08/10/2022 for right flank pain.  Patient states he passed stone but did not keep to send for analysis. CT Abd.Pelvis 08/10/2022: A calculus measuring 3.4 mm is seen in the right proximal ureter (Series 2 image 53) causing mild hydroureteronephrosis.  Today patient here to discuss results of KUB, pending results x-ray done today.  Today patient denies any flank pain, difficulty urinating, urinary retention, gross hematuria.  Patient recently seen in ER gastritis would like referred to me practice in GI to be worked up.        Allergies:  Review of patient's allergies indicates:   Allergen Reactions    Penicillins Rash       Medications:  Current Outpatient Medications   Medication Sig Dispense Refill    dicyclomine (BENTYL) 10 MG capsule Take 1 capsule (10 mg total) by mouth every 6 (six) hours as needed (abdominal cramping). 20 capsule 0    famotidine (PEPCID) 20 MG tablet Take 1 tablet (20 mg total) by mouth 2 (two) times daily. for 10 days 20 tablet 0     No current facility-administered medications for this visit.       Review of Systems:  General: No fever, chills, fatigability, or weight loss.  Skin: No rashes, itching, or changes in color or texture of skin.  Chest: Denies HERNANDES, cyanosis, wheezing, cough, and sputum production.  Abdomen: Appetite fine. No weight loss. Denies diarrhea, abdominal pain, hematemesis, or blood in stool.  Musculoskeletal: No joint stiffness or swelling. Denies back pain.  : As above.  All other review of systems negative.    PMH:  Past Medical History:   Diagnosis Date    Asthma     Heart murmur        PSH:  No past surgical history on file.    FamHx:  No family history on file.    SocHx:  Social History     Socioeconomic History    Marital status: Single   Tobacco Use    Smoking status: Never    Smokeless tobacco: Never        Physical Exam:  There were no vitals filed for this visit.  General: A&Ox3, no apparent distress, no deformities  Neck: No masses, normal thyroid  Lungs: CTA geovany, no use of accessory muscles  Heart: RRR, no arrhythmias  Abdomen: Soft, NT, ND, no masses, no hernias, no hepatosplenomegaly  Lymphatic: Neck and groin nodes negative  Skin: The skin is warm and dry. No jaundice.  Ext: No c/c/e.        Imaging:  Pending results from KUB done today      Impression:  Kidney stones, hydronephrosis      Plan:  Instructed patient will notify him of KUB results when completed.  Will refer patient to Family Practice for establishment and workup.  Instructed patient to increase fluid intake, limit salt in diet, limit protein to 30%, intake adequate calcium, decrease brand, soy, Beets, Almonds, Rhubard, Buckwheat flour, baked potato, raspberry, potato chips Spinach, Miso, Tahini, sesame, Swiss Chard. Instructed patient on Avoiding bladder irritants, such as alcohol, citrus drinks or foods,salty foods, energy drinks, spicy foods, caffeine, sodas.

## 2023-03-28 ENCOUNTER — TELEPHONE (OUTPATIENT)
Dept: UROLOGY | Facility: CLINIC | Age: 28
End: 2023-03-28
Payer: MEDICAID

## 2023-10-16 ENCOUNTER — HOSPITAL ENCOUNTER (OUTPATIENT)
Dept: RADIOLOGY | Facility: HOSPITAL | Age: 28
Discharge: HOME | End: 2023-10-16
Attending: NURSE PRACTITIONER
Payer: MEDICAID

## 2023-10-16 DIAGNOSIS — N20.1 URETEROLITHIASIS: ICD-10-CM

## 2023-10-16 PROCEDURE — 76775 US EXAM ABDO BACK WALL LIM: CPT | Mod: TC

## 2023-11-08 ENCOUNTER — OFFICE VISIT (OUTPATIENT)
Dept: FAMILY MEDICINE | Facility: CLINIC | Age: 28
End: 2023-11-08
Payer: MEDICAID

## 2023-11-08 VITALS
BODY MASS INDEX: 50.48 KG/M2 | TEMPERATURE: 98 F | OXYGEN SATURATION: 97 % | HEIGHT: 65 IN | SYSTOLIC BLOOD PRESSURE: 121 MMHG | HEART RATE: 63 BPM | DIASTOLIC BLOOD PRESSURE: 81 MMHG | WEIGHT: 303 LBS | RESPIRATION RATE: 18 BRPM

## 2023-11-08 DIAGNOSIS — Z23 ENCOUNTER FOR IMMUNIZATION: ICD-10-CM

## 2023-11-08 DIAGNOSIS — F32.A DEPRESSION, UNSPECIFIED DEPRESSION TYPE: ICD-10-CM

## 2023-11-08 DIAGNOSIS — Z23 NEED FOR INFLUENZA VACCINATION: ICD-10-CM

## 2023-11-08 DIAGNOSIS — K21.9 GASTROESOPHAGEAL REFLUX DISEASE WITHOUT ESOPHAGITIS: ICD-10-CM

## 2023-11-08 DIAGNOSIS — R10.84 GENERALIZED ABDOMINAL PAIN: ICD-10-CM

## 2023-11-08 DIAGNOSIS — E66.01 CLASS 3 SEVERE OBESITY DUE TO EXCESS CALORIES WITHOUT SERIOUS COMORBIDITY WITH BODY MASS INDEX (BMI) OF 50.0 TO 59.9 IN ADULT: ICD-10-CM

## 2023-11-08 DIAGNOSIS — Z00.00 ANNUAL PHYSICAL EXAM: Primary | ICD-10-CM

## 2023-11-08 PROBLEM — E66.813 CLASS 3 SEVERE OBESITY DUE TO EXCESS CALORIES WITHOUT SERIOUS COMORBIDITY WITH BODY MASS INDEX (BMI) OF 50.0 TO 59.9 IN ADULT: Status: ACTIVE | Noted: 2023-11-08

## 2023-11-08 LAB
ALBUMIN SERPL-MCNC: 4.1 G/DL (ref 3.5–5)
ALBUMIN/GLOB SERPL: 1 RATIO (ref 1.1–2)
ALP SERPL-CCNC: 76 UNIT/L (ref 40–150)
ALT SERPL-CCNC: 24 UNIT/L (ref 0–55)
APPEARANCE UR: CLEAR
AST SERPL-CCNC: 16 UNIT/L (ref 5–34)
BACTERIA #/AREA URNS AUTO: ABNORMAL /HPF
BASOPHILS # BLD AUTO: 0.04 X10(3)/MCL
BASOPHILS NFR BLD AUTO: 0.6 %
BILIRUB SERPL-MCNC: 0.5 MG/DL
BILIRUB UR QL STRIP.AUTO: NEGATIVE
BUN SERPL-MCNC: 13.9 MG/DL (ref 8.9–20.6)
CALCIUM SERPL-MCNC: 10.3 MG/DL (ref 8.4–10.2)
CHLORIDE SERPL-SCNC: 101 MMOL/L (ref 98–107)
CHOLEST SERPL-MCNC: 259 MG/DL
CHOLEST/HDLC SERPL: 6 {RATIO} (ref 0–5)
CO2 SERPL-SCNC: 29 MMOL/L (ref 22–29)
COLOR UR AUTO: YELLOW
CREAT SERPL-MCNC: 0.95 MG/DL (ref 0.73–1.18)
EOSINOPHIL # BLD AUTO: 0.15 X10(3)/MCL (ref 0–0.9)
EOSINOPHIL NFR BLD AUTO: 2.1 %
ERYTHROCYTE [DISTWIDTH] IN BLOOD BY AUTOMATED COUNT: 14.1 % (ref 11.5–17)
GFR SERPLBLD CREATININE-BSD FMLA CKD-EPI: >60 MLS/MIN/1.73/M2
GLOBULIN SER-MCNC: 4.2 GM/DL (ref 2.4–3.5)
GLUCOSE SERPL-MCNC: 66 MG/DL (ref 74–100)
GLUCOSE UR QL STRIP.AUTO: NORMAL
HCT VFR BLD AUTO: 46.2 % (ref 42–52)
HCV AB SERPL QL IA: NONREACTIVE
HDLC SERPL-MCNC: 46 MG/DL (ref 35–60)
HGB BLD-MCNC: 14.7 G/DL (ref 14–18)
HIV 1+2 AB+HIV1 P24 AG SERPL QL IA: NONREACTIVE
HYALINE CASTS #/AREA URNS LPF: ABNORMAL /LPF
IMM GRANULOCYTES # BLD AUTO: 0.02 X10(3)/MCL (ref 0–0.04)
IMM GRANULOCYTES NFR BLD AUTO: 0.3 %
KETONES UR QL STRIP.AUTO: NEGATIVE
LDLC SERPL CALC-MCNC: 174 MG/DL (ref 50–140)
LEUKOCYTE ESTERASE UR QL STRIP.AUTO: NEGATIVE
LYMPHOCYTES # BLD AUTO: 1.67 X10(3)/MCL (ref 0.6–4.6)
LYMPHOCYTES NFR BLD AUTO: 23 %
MCH RBC QN AUTO: 26.5 PG (ref 27–31)
MCHC RBC AUTO-ENTMCNC: 31.8 G/DL (ref 33–36)
MCV RBC AUTO: 83.2 FL (ref 80–94)
MONOCYTES # BLD AUTO: 0.48 X10(3)/MCL (ref 0.1–1.3)
MONOCYTES NFR BLD AUTO: 6.6 %
MUCOUS THREADS URNS QL MICRO: ABNORMAL /LPF
NEUTROPHILS # BLD AUTO: 4.9 X10(3)/MCL (ref 2.1–9.2)
NEUTROPHILS NFR BLD AUTO: 67.4 %
NITRITE UR QL STRIP.AUTO: NEGATIVE
NRBC BLD AUTO-RTO: 0 %
PH UR STRIP.AUTO: 6 [PH]
PLATELET # BLD AUTO: 320 X10(3)/MCL (ref 130–400)
PMV BLD AUTO: 11.1 FL (ref 7.4–10.4)
POTASSIUM SERPL-SCNC: 4.3 MMOL/L (ref 3.5–5.1)
PROT SERPL-MCNC: 8.3 GM/DL (ref 6.4–8.3)
PROT UR QL STRIP.AUTO: ABNORMAL
RBC # BLD AUTO: 5.55 X10(6)/MCL (ref 4.7–6.1)
RBC #/AREA URNS AUTO: ABNORMAL /HPF
RBC UR QL AUTO: NEGATIVE
SODIUM SERPL-SCNC: 142 MMOL/L (ref 136–145)
SP GR UR STRIP.AUTO: 1.03 (ref 1–1.03)
SQUAMOUS #/AREA URNS LPF: ABNORMAL /HPF
TRIGL SERPL-MCNC: 197 MG/DL (ref 34–140)
TSH SERPL-ACNC: 4.62 UIU/ML (ref 0.35–4.94)
UROBILINOGEN UR STRIP-ACNC: NORMAL
VLDLC SERPL CALC-MCNC: 39 MG/DL
WBC # SPEC AUTO: 7.26 X10(3)/MCL (ref 4.5–11.5)
WBC #/AREA URNS AUTO: ABNORMAL /HPF

## 2023-11-08 PROCEDURE — 85025 COMPLETE CBC W/AUTO DIFF WBC: CPT | Performed by: NURSE PRACTITIONER

## 2023-11-08 PROCEDURE — 90686 IIV4 VACC NO PRSV 0.5 ML IM: CPT | Mod: PBBFAC

## 2023-11-08 PROCEDURE — 90651 9VHPV VACCINE 2/3 DOSE IM: CPT | Mod: PBBFAC

## 2023-11-08 PROCEDURE — 3008F BODY MASS INDEX DOCD: CPT | Mod: CPTII,,, | Performed by: NURSE PRACTITIONER

## 2023-11-08 PROCEDURE — 1159F PR MEDICATION LIST DOCUMENTED IN MEDICAL RECORD: ICD-10-PCS | Mod: CPTII,,, | Performed by: NURSE PRACTITIONER

## 2023-11-08 PROCEDURE — 99215 OFFICE O/P EST HI 40 MIN: CPT | Mod: PBBFAC | Performed by: NURSE PRACTITIONER

## 2023-11-08 PROCEDURE — 3008F PR BODY MASS INDEX (BMI) DOCUMENTED: ICD-10-PCS | Mod: CPTII,,, | Performed by: NURSE PRACTITIONER

## 2023-11-08 PROCEDURE — 1160F PR REVIEW ALL MEDS BY PRESCRIBER/CLIN PHARMACIST DOCUMENTED: ICD-10-PCS | Mod: CPTII,,, | Performed by: NURSE PRACTITIONER

## 2023-11-08 PROCEDURE — 3074F SYST BP LT 130 MM HG: CPT | Mod: CPTII,,, | Performed by: NURSE PRACTITIONER

## 2023-11-08 PROCEDURE — 3079F DIAST BP 80-89 MM HG: CPT | Mod: CPTII,,, | Performed by: NURSE PRACTITIONER

## 2023-11-08 PROCEDURE — 36415 COLL VENOUS BLD VENIPUNCTURE: CPT | Performed by: NURSE PRACTITIONER

## 2023-11-08 PROCEDURE — 86803 HEPATITIS C AB TEST: CPT | Performed by: NURSE PRACTITIONER

## 2023-11-08 PROCEDURE — 87389 HIV-1 AG W/HIV-1&-2 AB AG IA: CPT | Performed by: NURSE PRACTITIONER

## 2023-11-08 PROCEDURE — 1160F RVW MEDS BY RX/DR IN RCRD: CPT | Mod: CPTII,,, | Performed by: NURSE PRACTITIONER

## 2023-11-08 PROCEDURE — 3079F PR MOST RECENT DIASTOLIC BLOOD PRESSURE 80-89 MM HG: ICD-10-PCS | Mod: CPTII,,, | Performed by: NURSE PRACTITIONER

## 2023-11-08 PROCEDURE — 3074F PR MOST RECENT SYSTOLIC BLOOD PRESSURE < 130 MM HG: ICD-10-PCS | Mod: CPTII,,, | Performed by: NURSE PRACTITIONER

## 2023-11-08 PROCEDURE — 84443 ASSAY THYROID STIM HORMONE: CPT | Performed by: NURSE PRACTITIONER

## 2023-11-08 PROCEDURE — 81001 URINALYSIS AUTO W/SCOPE: CPT | Performed by: NURSE PRACTITIONER

## 2023-11-08 PROCEDURE — 80053 COMPREHEN METABOLIC PANEL: CPT | Performed by: NURSE PRACTITIONER

## 2023-11-08 PROCEDURE — 90472 IMMUNIZATION ADMIN EACH ADD: CPT | Mod: PBBFAC

## 2023-11-08 PROCEDURE — 90471 IMMUNIZATION ADMIN: CPT | Mod: PBBFAC

## 2023-11-08 PROCEDURE — 99395 PR PREVENTIVE VISIT,EST,18-39: ICD-10-PCS | Mod: S$PBB,,, | Performed by: NURSE PRACTITIONER

## 2023-11-08 PROCEDURE — 99395 PREV VISIT EST AGE 18-39: CPT | Mod: S$PBB,,, | Performed by: NURSE PRACTITIONER

## 2023-11-08 PROCEDURE — 1159F MED LIST DOCD IN RCRD: CPT | Mod: CPTII,,, | Performed by: NURSE PRACTITIONER

## 2023-11-08 PROCEDURE — 80061 LIPID PANEL: CPT | Performed by: NURSE PRACTITIONER

## 2023-11-08 RX ORDER — OMEPRAZOLE 40 MG/1
40 CAPSULE, DELAYED RELEASE ORAL EVERY MORNING
Qty: 30 CAPSULE | Refills: 5 | Status: SHIPPED | OUTPATIENT
Start: 2023-11-08

## 2023-11-08 RX ADMIN — HUMAN PAPILLOMAVIRUS 9-VALENT VACCINE, RECOMBINANT 0.5 ML: 30; 40; 60; 40; 20; 20; 20; 20; 20 INJECTION, SUSPENSION INTRAMUSCULAR at 10:11

## 2023-11-08 RX ADMIN — INFLUENZA VIRUS VACCINE 0.5 ML: 15; 15; 15; 15 SUSPENSION INTRAMUSCULAR at 10:11

## 2023-11-08 NOTE — ASSESSMENT & PLAN NOTE
PHQ-9 score is 8.  Patient declined treatment or behavior Health counseling.  Patient voice not interest in things.  Continue to verbalize his answers to the questions were vague and he does not believe he needs any kind of treatment and he is okay at this time.  Discussed with patient he is always welcome to come back to discuss issues and concerns.  Questions solicited answered, patient verbalized and agreed.

## 2023-11-08 NOTE — PATIENT INSTRUCTIONS
Fercho Sarkar,     If you are due for any health screening(s) below please notify me so we can arrange them to be ordered and scheduled. Most healthy patients at your age complete them, but you are free to accept or refuse.     If you can't do it, I'll definitely understand. If you can, I'd certainly appreciate it!    All of your core healthy metrics are met.

## 2023-11-08 NOTE — ASSESSMENT & PLAN NOTE
Discussed lifestyle modifications and healthy eating habits.  Continue to exercise.  Discussed calorie control, portion control.  Discussed low-fat, low-cholesterol diet.  Discussed low-salt diet.  Keep fiber intake 30 g per day if possible.  Stay hydrated with water.  Patient to read discharge education materials.  Questions solicited and answered, patient verbalized and agreed to plan.

## 2023-11-08 NOTE — ASSESSMENT & PLAN NOTE
Patient report he has been having issue with his abdomen.  Describes as pressure upper quadrant and he feels that his food does not get digested.  On March 17th 2023 he was seen at a local emergency department and was diagnosed with acute gastritis and was prescribed Pepcid 20 mg p.o. b.i.d..  Patient state at that time he was eating a lot of fried food especially chicken and he used to eat it daily.  Does report of acid reflux.  Also reports gas pain.  Denies any fever or chills for nausea or vomiting or diarrhea or chest pain or short of breath.  Discussed with patient GERD diet.  Discussed Rx omeprazole 40 mg p.o. once daily 30 minutes on empty stomach before 1st meal.  Discussed collecting stool sample to rule out H pylori .  Discussed stay hydrated with water.  Patient referred to see Dr. Vides for evaluation and continuum of care.  Discussed weight loss and lifestyle modification and healthy eating habits. Patient to read discharge education materials.  Questions solicited and answered, patient verbalized and agreed to plan.

## 2023-11-08 NOTE — PROGRESS NOTES
Please notify patient regarding lab work results.  Blood count no sign of infection, no sign of anemia.  Chemistry kidney and liver function within acceptable range.  Electrolytes within acceptable range.  Cholesterol elevated at 259, bad cholesterol at 174.  Very important to follow low-fat, low-cholesterol diet, stay physically active, keep fiber intake 30 g per day if possible, stay hydrated with water, lose weight.  Urine no sign of infection.  Stay hydrated with water.  Thyroid function test within acceptable range.  HIV and hep C no sign of infection.  Please call the clinic if you have any questions or concern return as discussed.

## 2023-11-08 NOTE — PROGRESS NOTES
Patient Name: Mello Olmedo   : 1995  MRN: [id number]     SUBJECTIVE DATA:    CHIEF COMPLAINT:   Mello Olmedo is a 28 y.o. male who presents to clinic today with Establish Care, Back Pain, and Depression        HPI:  28 old male presents to the clinic to establish care and also would like to his yearly wellness exam.  Patient would like to discuss abdominal discomfort.    Abd discomfort:  Patient report he has been having issue with his abdomen.  Describes as pressure upper quadrant and he feels that his food does not get digested.  On 2023 he was seen at a local emergency department and was diagnosed with acute gastritis and was prescribed Pepcid 20 mg p.o. b.i.d..  Patient state at that time he was eating a lot of fried food especially chicken and he used to eat it daily.  Does report of acid reflux.  Also reports gas pain.  Denies any fever or chills for nausea or vomiting or diarrhea or chest pain or short of breath.  Discussed with patient GERD diet.  Discussed Rx omeprazole 40 mg p.o. once daily 30 minutes on empty stomach before 1st meal.  Discussed collecting stool sample to rule out H pylori .  Discussed stay hydrated with water.  Patient referred to see Dr. Vides for evaluation and continuum of care.  Discussed weight loss and lifestyle modification and healthy eating habits. Patient to read discharge education materials.  Questions solicited and answered, patient verbalized and agreed to plan.    Depression:  PHQ-9 score is 8.  Patient declined treatment or behavior Health counseling.  Patient voice not interest in things.  Continue to verbalize his answers to the questions were vague and he does not believe he needs any kind of treatment and he is okay at this time.  Discussed with patient he is always welcome to come back to discuss issues and concerns.  Questions solicited answered, patient verbalized and agreed.    Care gaps:  HPV patient agreed to receive immunization  Flu vaccine  patient agreed to receive immunization  Social Determinants of Health     Tobacco Use: Medium Risk (11/8/2023)    Patient History     Smoking Tobacco Use: Former     Smokeless Tobacco Use: Never     Passive Exposure: Past   Alcohol Use: Not At Risk (11/8/2023)    AUDIT-C     Frequency of Alcohol Consumption: 2-4 times a month     Average Number of Drinks: 1 or 2     Frequency of Binge Drinking: Never   Financial Resource Strain: Low Risk  (11/8/2023)    Overall Financial Resource Strain (CARDIA)     Difficulty of Paying Living Expenses: Not hard at all   Food Insecurity: No Food Insecurity (11/8/2023)    Hunger Vital Sign     Worried About Running Out of Food in the Last Year: Never true     Ran Out of Food in the Last Year: Never true   Transportation Needs: No Transportation Needs (11/8/2023)    PRAPARE - Transportation     Lack of Transportation (Medical): No     Lack of Transportation (Non-Medical): No   Physical Activity: Sufficiently Active (11/8/2023)    Exercise Vital Sign     Days of Exercise per Week: 5 days     Minutes of Exercise per Session: 40 min   Recent Concern: Physical Activity - Insufficiently Active (11/8/2023)    Exercise Vital Sign     Days of Exercise per Week: 3 days     Minutes of Exercise per Session: 40 min   Stress: Stress Concern Present (11/8/2023)    [employer] [place] [employer] - Occupational Stress Questionnaire     Feeling of Stress : To some extent   Social Connections: Socially Integrated (11/8/2023)    Social Connection and Isolation Panel [NHANES]     Frequency of Communication with Friends and Family: Three times a week     Frequency of Social Gatherings with Friends and Family: Three times a week     Attends [employer] Services: 1 to 4 times per year     Active Member of Clubs or Organizations: No     Attends Club or Organization Meetings: 1 to 4 times per year     Marital Status: Living with partner   Housing Stability: Low Risk  (11/8/2023)    Housing Stability  "Vital Sign     Unable to Pay for Housing in the Last Year: No     Number of Places Lived in the Last Year: 1     Unstable Housing in the Last Year: No   Depression: Medium Risk (11/8/2023)    Depression     Last PHQ-4: Flowsheet Data: 8     Gun safety:   Guns are secured at home.  Car safety:  Seat belt used all the time.  Water:  Stay hydrated with fluids, drink 6-8 cups of water daily.  Tobacco use:   Alcohol:  Drink in moderation.  Exercise: exercise 30 minutes a day up to 5 days a week.  Stay active.  Lose weight.  Nutrition:  Follow low-cholesterol, low-fat, low-salt diet.  Eat fresh fruits and vegetables.  Keep in mind portion size.  Dental:  Patient does not follow-up with a dentist yearly.  Ophthalmology:  Patient does not follow-up with ophthalmologist yearly.  Immunizations: flu/hpv.  Fasting blood work drawn today will notify of test results when they become available.      Patient denies chest pain, shortness of breath, dyspnea on exertion, palpitations, peripheral edema, abdominal pain, nausea, vomiting, diarrhea, constipation, fatigue, fever, chills, dysuria,  hematuria, melena, or hematochezia.       ALLERGIES:   Review of patient's allergies indicates:   Allergen Reactions    Penicillins Rash         ROS:  Review of Systems   Gastrointestinal:  Positive for abdominal pain and heartburn.   Psychiatric/Behavioral:  Positive for depression.    All other systems reviewed and are negative.        OBJECTIVE DATA:  Vital signs  Vitals:    11/08/23 0918   BP: 121/81   Pulse: 63   Resp: 18   Temp: 98.2 °F (36.8 °C)   TempSrc: Oral   SpO2: 97%   Weight: (!) 137.4 kg (303 lb)   Height: 5' 5" (1.651 m)      Body mass index is 50.42 kg/m².    PHYSICAL EXAM:   Physical Exam  Constitutional:       General: He is awake. He is not in acute distress.     Appearance: Normal appearance. He is well-developed. He is morbidly obese. He is not ill-appearing, toxic-appearing or diaphoretic.   HENT:      Head: Normocephalic " and atraumatic.      Right Ear: Tympanic membrane, ear canal and external ear normal.      Left Ear: Tympanic membrane, ear canal and external ear normal.      Nose: Nose normal.      Mouth/Throat:      Mouth: Mucous membranes are moist.      Dentition: Gingival swelling present.      Tongue: No lesions. Tongue does not deviate from midline.      Palate: No mass and lesions.      Pharynx: Oropharynx is clear. Uvula midline.      Comments: Dentist list provided for patient to call and schedule appointment.  Eyes:      General: Lids are normal. Gaze aligned appropriately.      Extraocular Movements: Extraocular movements intact.      Conjunctiva/sclera: Conjunctivae normal.      Pupils: Pupils are equal, round, and reactive to light.      Visual Fields: Right eye visual fields normal and left eye visual fields normal.   Neck:      Thyroid: No thyroid mass, thyromegaly or thyroid tenderness.      Trachea: Trachea and phonation normal.   Cardiovascular:      Rate and Rhythm: Normal rate and regular rhythm.      Pulses: Normal pulses.           Carotid pulses are 2+ on the right side and 2+ on the left side.       Radial pulses are 2+ on the right side and 2+ on the left side.        Femoral pulses are 2+ on the right side and 2+ on the left side.       Dorsalis pedis pulses are 2+ on the right side and 2+ on the left side.        Posterior tibial pulses are 2+ on the right side and 2+ on the left side.      Heart sounds: Normal heart sounds. No murmur heard.  Pulmonary:      Effort: Pulmonary effort is normal.      Breath sounds: Normal breath sounds and air entry. No wheezing or rhonchi.   Abdominal:      General: Bowel sounds are normal. There is no distension.      Palpations: Abdomen is soft. There is no mass.      Tenderness: There is no abdominal tenderness. There is no right CVA tenderness, left CVA tenderness, guarding or rebound.      Hernia: No hernia is present. There is no hernia in the left inguinal area or  right inguinal area.   Genitourinary:     Pubic Area: No rash.       Penis: Normal and circumcised.       Testes: Normal. Cremasteric reflex is present.      Epididymis:      Right: Normal.      Left: Normal.      Rectum: Normal.   Musculoskeletal:         General: Normal range of motion.      Cervical back: Normal range of motion and neck supple. No rigidity or tenderness.      Right lower leg: No edema.      Left lower leg: No edema.   Lymphadenopathy:      Cervical: No cervical adenopathy.      Lower Body: No right inguinal adenopathy. No left inguinal adenopathy.   Skin:     General: Skin is warm.      Capillary Refill: Capillary refill takes less than 2 seconds.      Findings: No rash.   Neurological:      General: No focal deficit present.      Mental Status: He is alert and oriented to person, place, and time. Mental status is at baseline.      GCS: GCS eye subscore is 4. GCS verbal subscore is 5. GCS motor subscore is 6.      Cranial Nerves: Cranial nerves 2-12 are intact. No cranial nerve deficit.      Sensory: Sensation is intact. No sensory deficit.      Motor: Motor function is intact. No weakness.      Coordination: Coordination is intact. Coordination normal.      Gait: Gait is intact. Gait normal.   Psychiatric:         Attention and Perception: Attention and perception normal.         Mood and Affect: Mood and affect normal.         Speech: Speech normal.         Behavior: Behavior normal. Behavior is cooperative.         Thought Content: Thought content normal.         Cognition and Memory: Cognition and memory normal.         Judgment: Judgment normal.          ASSESSMENT/PLAN:  1. Annual physical exam  -     CBC Auto Differential  -     Comprehensive Metabolic Panel  -     TSH  -     Urinalysis, Reflex to Urine Culture  -     Hepatitis C Antibody  -     HIV 1/2 Ag/Ab (4th Gen)  -     Lipid Panel  -     hpv vaccine,9-swapna (GARDASIL 9) vaccine 0.5 mL  -     Ambulatory referral/consult to  Ophthalmology; Future; Expected date: 11/15/2023    2. Generalized abdominal pain  Assessment & Plan:   Patient report he has been having issue with his abdomen.  Describes as pressure upper quadrant and he feels that his food does not get digested.  On March 17th 2023 he was seen at a local emergency department and was diagnosed with acute gastritis and was prescribed Pepcid 20 mg p.o. b.i.d..  Patient state at that time he was eating a lot of fried food especially chicken and he used to eat it daily.  Does report of acid reflux.  Also reports gas pain.  Denies any fever or chills for nausea or vomiting or diarrhea or chest pain or short of breath.  Discussed with patient GERD diet.  Discussed Rx omeprazole 40 mg p.o. once daily 30 minutes on empty stomach before 1st meal.  Discussed collecting stool sample to rule out H pylori .  Discussed stay hydrated with water.  Patient referred to see Dr. Vides for evaluation and continuum of care.  Discussed weight loss and lifestyle modification and healthy eating habits. Patient to read discharge education materials.  Questions solicited and answered, patient verbalized and agreed to plan.    Orders:  -     Helicobacter Pylori Antigen Fecal EIA; Future; Expected date: 11/08/2023  -     omeprazole (PRILOSEC) 40 MG capsule; Take 1 capsule (40 mg total) by mouth every morning.  Dispense: 30 capsule; Refill: 5  -     Ambulatory referral/consult to Gastroenterology; Future; Expected date: 11/15/2023    3. Gastroesophageal reflux disease without esophagitis  Assessment & Plan:   Patient report he has been having issue with his abdomen.  Describes as pressure upper quadrant and he feels that his food does not get digested.  On March 17th 2023 he was seen at a local emergency department and was diagnosed with acute gastritis and was prescribed Pepcid 20 mg p.o. b.i.d..  Patient state at that time he was eating a lot of fried food especially chicken and he used to eat it daily.   Does report of acid reflux.  Also reports gas pain.  Denies any fever or chills for nausea or vomiting or diarrhea or chest pain or short of breath.  Discussed with patient GERD diet.  Discussed Rx omeprazole 40 mg p.o. once daily 30 minutes on empty stomach before 1st meal.  Discussed collecting stool sample to rule out H pylori .  Discussed stay hydrated with water.  Patient referred to see Dr. Vides for evaluation and continuum of care.  Discussed weight loss and lifestyle modification and healthy eating habits. Patient to read discharge education materials.  Questions solicited and answered, patient verbalized and agreed to plan.    Orders:  -     Helicobacter Pylori Antigen Fecal EIA; Future; Expected date: 11/08/2023  -     omeprazole (PRILOSEC) 40 MG capsule; Take 1 capsule (40 mg total) by mouth every morning.  Dispense: 30 capsule; Refill: 5  -     Ambulatory referral/consult to Gastroenterology; Future; Expected date: 11/15/2023    4. Depression, unspecified depression type  Assessment & Plan:  PHQ-9 score is 8.  Patient declined treatment or behavior Health counseling.  Patient voice not interest in things.  Continue to verbalize his answers to the questions were vague and he does not believe he needs any kind of treatment and he is okay at this time.  Discussed with patient he is always welcome to come back to discuss issues and concerns.  Questions solicited answered, patient verbalized and agreed.      5. Class 3 severe obesity due to excess calories without serious comorbidity with body mass index (BMI) of 50.0 to 59.9 in adult  Assessment & Plan:  Discussed lifestyle modifications and healthy eating habits.  Continue to exercise.  Discussed calorie control, portion control.  Discussed low-fat, low-cholesterol diet.  Discussed low-salt diet.  Keep fiber intake 30 g per day if possible.  Stay hydrated with water.  Patient to read discharge education materials.  Questions solicited and answered,  patient verbalized and agreed to plan.      6. Need for influenza vaccination  -     influenza (QUADRIVALENT PF) vaccine 0.5 mL    7. Encounter for immunization  -     hpv vaccine,9-swapna (GARDASIL 9) vaccine 0.5 mL           RESULTS:  No results found for this or any previous visit (from the past 1008 hour(s)).      Follow Up:  Follow up in about 6 months (around 5/8/2024).     Face to face time 45 minutes, including documentation, chart review, counseling, education, review of test results, relevant medical records, and coordination of care.   I have explained the treatment plan, diagnosis, and prognosis to patient. All questions are answered to the best of my knowledge.     Previous medical history/lab work/radiology reviewed and considered during medical management decisions.   Medication list reviewed and medication reconciliation performed.  Patient was provided  and care about his/her current diagnosis (es) and medications including risk/benefit and side effects/adverse events, over the counter medication uses/doses, home self-care and contact precautions,  and red flags and indications for when to seek immediate medical attention.   Patient was advised to continue compliance with current medication list and medical recommendations.  Patient dvised continued compliance with recommended eating habits/ diets for medical conditions and exercise 150 minutes/ week (if possible) for medical condition (s).  Educational handouts and instructions on selected disease management in AVS (After Visit Summary).    All of the patient's questions were answered to patient's satisfaction.   The patient was receptive, expressed verbal understanding and agreement the above plan.     This note was created with the assistance of a voice recognition software or phone dictation. There may be transcription errors as a result of using this technology however minimal. Effort has been made to assure accuracy of transcription but  any obvious errors or omissions should be clarified with the author of the document

## 2023-11-09 ENCOUNTER — TELEPHONE (OUTPATIENT)
Dept: FAMILY MEDICINE | Facility: CLINIC | Age: 28
End: 2023-11-09
Payer: MEDICAID

## 2023-11-09 NOTE — TELEPHONE ENCOUNTER
----- Message from ANDREA Almanza sent at 11/8/2023  4:44 PM CST -----  Please notify patient regarding lab work results.  Blood count no sign of infection, no sign of anemia.  Chemistry kidney and liver function within acceptable range.  Electrolytes within acceptable range.  Cholesterol elevated at 259, bad cholesterol at 174.  Very important to follow low-fat, low-cholesterol diet, stay physically active, keep fiber intake 30 g per day if possible, stay hydrated with water, lose weight.  Urine no sign of infection.  Stay hydrated with water.  Thyroid function test within acceptable range.  HIV and hep C no sign of infection.  Please call the clinic if you have any questions or concern return as discussed.

## 2024-02-08 ENCOUNTER — TELEPHONE (OUTPATIENT)
Dept: FAMILY MEDICINE | Facility: CLINIC | Age: 29
End: 2024-02-08
Payer: MEDICAID

## 2024-02-08 NOTE — TELEPHONE ENCOUNTER
Spoke to patient and I will fax referral for him    ----- Message from Katia Montalvo sent at 2/7/2024  2:52 PM CST -----  Regarding: Referral  General Phone Message    Caller is:  (x ) Patient  (  ) Family  (  ) Pharmacy    (  ) Home Health  (  ) Other     Provider: ANDREA Ye    Last Visit:    Next Visit: 5/8/2024    Reason for Call: Patient is asking if you could send his referral for Ophthalmology to the [facility] on [address].                   Best Time to Contact:    Preferred Phone Number:

## 2024-02-12 PROBLEM — Z00.00 ANNUAL PHYSICAL EXAM: Status: RESOLVED | Noted: 2023-11-08 | Resolved: 2024-02-12

## 2024-07-16 ENCOUNTER — OFFICE VISIT (OUTPATIENT)
Dept: FAMILY MEDICINE | Facility: CLINIC | Age: 29
End: 2024-07-16
Payer: MEDICAID

## 2024-07-16 VITALS
OXYGEN SATURATION: 96 % | RESPIRATION RATE: 18 BRPM | WEIGHT: 296 LBS | HEART RATE: 76 BPM | HEIGHT: 65 IN | DIASTOLIC BLOOD PRESSURE: 85 MMHG | TEMPERATURE: 98 F | SYSTOLIC BLOOD PRESSURE: 126 MMHG | BODY MASS INDEX: 49.31 KG/M2

## 2024-07-16 DIAGNOSIS — F32.9 REACTIVE DEPRESSION: ICD-10-CM

## 2024-07-16 DIAGNOSIS — M54.41 ACUTE RIGHT-SIDED LOW BACK PAIN WITH RIGHT-SIDED SCIATICA: Primary | ICD-10-CM

## 2024-07-16 DIAGNOSIS — E66.01 CLASS 3 SEVERE OBESITY DUE TO EXCESS CALORIES WITHOUT SERIOUS COMORBIDITY WITH BODY MASS INDEX (BMI) OF 50.0 TO 59.9 IN ADULT: ICD-10-CM

## 2024-07-16 PROCEDURE — 3074F SYST BP LT 130 MM HG: CPT | Mod: CPTII,,, | Performed by: NURSE PRACTITIONER

## 2024-07-16 PROCEDURE — 99214 OFFICE O/P EST MOD 30 MIN: CPT | Mod: PBBFAC | Performed by: NURSE PRACTITIONER

## 2024-07-16 PROCEDURE — 99214 OFFICE O/P EST MOD 30 MIN: CPT | Mod: S$PBB,,, | Performed by: NURSE PRACTITIONER

## 2024-07-16 PROCEDURE — 1160F RVW MEDS BY RX/DR IN RCRD: CPT | Mod: CPTII,,, | Performed by: NURSE PRACTITIONER

## 2024-07-16 PROCEDURE — 3079F DIAST BP 80-89 MM HG: CPT | Mod: CPTII,,, | Performed by: NURSE PRACTITIONER

## 2024-07-16 PROCEDURE — 3008F BODY MASS INDEX DOCD: CPT | Mod: CPTII,,, | Performed by: NURSE PRACTITIONER

## 2024-07-16 PROCEDURE — 1159F MED LIST DOCD IN RCRD: CPT | Mod: CPTII,,, | Performed by: NURSE PRACTITIONER

## 2024-07-16 RX ORDER — FLUOXETINE 10 MG/1
10 CAPSULE ORAL DAILY
Qty: 7 CAPSULE | Refills: 0 | Status: SHIPPED | OUTPATIENT
Start: 2024-07-16 | End: 2024-07-23

## 2024-07-16 RX ORDER — METHOCARBAMOL 750 MG/1
750 TABLET, FILM COATED ORAL NIGHTLY PRN
Qty: 20 TABLET | Refills: 1 | Status: SHIPPED | OUTPATIENT
Start: 2024-07-16

## 2024-07-16 RX ORDER — FLUOXETINE HYDROCHLORIDE 20 MG/1
20 CAPSULE ORAL DAILY
Qty: 30 CAPSULE | Refills: 0 | Status: SHIPPED | OUTPATIENT
Start: 2024-07-24 | End: 2024-08-23

## 2024-07-16 RX ORDER — DICLOFENAC SODIUM 75 MG/1
75 TABLET, DELAYED RELEASE ORAL 2 TIMES DAILY PRN
Qty: 20 TABLET | Refills: 1 | Status: SHIPPED | OUTPATIENT
Start: 2024-07-16

## 2024-07-16 RX ORDER — PREDNISONE 20 MG/1
20 TABLET ORAL 2 TIMES DAILY
Qty: 8 TABLET | Refills: 0 | Status: SHIPPED | OUTPATIENT
Start: 2024-07-16 | End: 2024-07-20

## 2024-07-16 NOTE — PROGRESS NOTES
"Patient Name: Mello Olmedo   : 1995  MRN: [id number]     SUBJECTIVE DATA:    CHIEF COMPLAINT:   Mello Olmedo is a 29 y.o. male who presents to clinic today with Back Pain and Insomnia        HPI 29-year-old male presents to the clinic to discuss depression, back pain, insomnia.  Past medical history of depression, back pain.    Depression: PHQ-9 score 11, ZAIRE-7 score 6.  Patient state depression has been going on for quite some time which has been affecting his sleep.  Previously patient has mentioned this issue in the past but he declined any type of management.  Today patient requesting treatment.  Some of the triggers both of his parents passed away from drug overdose although he does not seem to have any connection with his parents.  Patient voice him and his mother were not close and he did not want to elaborate more about the relationship.  Patient state, even what happened to his parents he does not believe is the cause of his depression and insomnia.  Patient state he does exercise and tries to eat healthy feels better but at times continue to feel sad.  Patient state" something is there but I do not know what it is".  Patient state I even tried my grandmother trazodone 100 mg p.o. at night and it did not help me fall asleep.  Patient state he never been on any depression medications or medications for insomnia.  Patient state he tried melatonin in the past but it did not help.  Discussed Prozac 10 mg p.o. for 7 days and then to increase to 20 mg once a day until he returns for virtual visit in 6 week.  Patient to read discharge education materials.  Questions solicited and answered, patient verbalized and agreed to plan.    Lower back pain:  Symptom started about a week ago.   Patient state since he started exercising bilateral low back pain right side is greater than left.   Pain is 8/10.  Patient state he is uncomfortable going to bed due to pain.  Patient state right-sided pain radiate to right lower " "extremity.  Denies any urinary incontinence or bowel habit changes.  Discussed treatment as follow:  Rx prednisone 20 mg p.o. twice a day 1 tab with breakfast the 2nd dose with lunch.  Stay hydrated with water.  Rx Robaxin 750 mg 1-2 tab at bedtime as needed for back pain and spasm.  Rx diclofenac 75 mg p.o. b.i.d. as needed for pain, take with food, do not take with any other NSAIDs such as Advil, ibuprofen, Aleve, naproxen, Motrin.  Stay hydrated with water.  Rest.  Alternate between heat and ice.  Daily stretching.  Weight loss.  Healthy eating habits and lifestyle modifications.  Patient to read discharge education materials.  Return to clinic if no improvement.      Patient denies chest pain, shortness of breath, dyspnea on exertion, palpitations, peripheral edema, abdominal pain, nausea, vomiting, diarrhea, constipation, fatigue, fever, chills, dysuria,  hematuria, dark stools or bloody stools.          ALLERGIES:   Review of patient's allergies indicates:   Allergen Reactions    Penicillins Rash         ROS:  Review of Systems   Musculoskeletal:  Positive for back pain.   Psychiatric/Behavioral:  Positive for depression. The patient is nervous/anxious and has insomnia.    All other systems reviewed and are negative.        OBJECTIVE DATA:  Vital signs  Vitals:    07/16/24 0917   BP: 126/85   Pulse: 76   Resp: 18   Temp: 97.9 °F (36.6 °C)   TempSrc: Oral   SpO2: 96%   Weight: 134.3 kg (296 lb)   Height: 5' 5" (1.651 m)      Body mass index is 49.26 kg/m².    PHYSICAL EXAM:   Physical Exam  Vitals and nursing note reviewed.   Constitutional:       General: He is awake. He is not in acute distress.     Appearance: Normal appearance. He is well-developed and well-groomed. He is morbidly obese. He is not ill-appearing, toxic-appearing or diaphoretic.   HENT:      Head: Normocephalic and atraumatic.      Right Ear: Tympanic membrane, ear canal and external ear normal.      Left Ear: Tympanic membrane, ear canal and " external ear normal.      Nose: Nose normal.      Mouth/Throat:      Lips: Pink.      Mouth: Mucous membranes are moist.      Pharynx: Oropharynx is clear. Uvula midline.   Eyes:      General: Lids are normal.      Extraocular Movements: Extraocular movements intact.      Conjunctiva/sclera: Conjunctivae normal.      Pupils: Pupils are equal, round, and reactive to light.   Neck:      Trachea: Trachea and phonation normal.   Cardiovascular:      Rate and Rhythm: Normal rate and regular rhythm.      Pulses: Normal pulses.           Radial pulses are 2+ on the right side and 2+ on the left side.        Dorsalis pedis pulses are 2+ on the right side and 2+ on the left side.      Heart sounds: Normal heart sounds. No murmur heard.  Pulmonary:      Effort: Pulmonary effort is normal.      Breath sounds: Normal breath sounds and air entry. No wheezing or rhonchi.   Abdominal:      General: Abdomen is flat.      Palpations: Abdomen is soft.      Tenderness: There is no right CVA tenderness or left CVA tenderness.   Musculoskeletal:         General: Normal range of motion.      Cervical back: Full passive range of motion without pain, normal range of motion and neck supple. No rigidity or tenderness.      Right lower leg: Tenderness present. No deformity or bony tenderness. No edema.      Left lower leg: Tenderness present. No deformity or bony tenderness. No edema.        Legs:    Lymphadenopathy:      Cervical: No cervical adenopathy.   Skin:     General: Skin is warm.      Capillary Refill: Capillary refill takes less than 2 seconds.      Findings: No rash.   Neurological:      General: No focal deficit present.      Mental Status: He is alert and oriented to person, place, and time. Mental status is at baseline.      GCS: GCS eye subscore is 4. GCS verbal subscore is 5. GCS motor subscore is 6.      Cranial Nerves: Cranial nerves 2-12 are intact. No cranial nerve deficit.      Sensory: Sensation is intact. No sensory  deficit.      Motor: Motor function is intact. No weakness.      Coordination: Coordination is intact. Coordination normal.      Gait: Gait is intact. Gait normal.   Psychiatric:         Attention and Perception: Attention normal.         Mood and Affect: Mood normal. Affect is flat.         Speech: Speech normal.         Behavior: Behavior normal. Behavior is cooperative.         Thought Content: Thought content normal. Thought content does not include homicidal or suicidal ideation.         Cognition and Memory: Cognition and memory normal.         Judgment: Judgment normal.          ASSESSMENT/PLAN:  1. Acute right-sided low back pain with right-sided sciatica  Assessment & Plan:  Symptom started about a week ago.   Patient state since he started exercising bilateral low back pain right side is greater than left.   Pain is 8/10.  Patient state he is uncomfortable going to bed due to pain.  Patient state right-sided pain radiate to right lower extremity.  Denies any urinary incontinence or bowel habit changes.  Discussed treatment as follow:  Rx prednisone 20 mg p.o. twice a day 1 tab with breakfast the 2nd dose with lunch.  Stay hydrated with water.  Rx Robaxin 750 mg 1-2 tab at bedtime as needed for back pain and spasm.  Rx diclofenac 75 mg p.o. b.i.d. as needed for pain, take with food, do not take with any other NSAIDs such as Advil, ibuprofen, Aleve, naproxen, Motrin.  Stay hydrated with water.  Rest.  Alternate between heat and ice.  Daily stretching.  Weight loss.  Healthy eating habits and lifestyle modifications.  Patient to read discharge education materials.  Return to clinic if no improvement.      Orders:  -     diclofenac (VOLTAREN) 75 MG EC tablet; Take 1 tablet (75 mg total) by mouth 2 (two) times daily as needed (pain).  Dispense: 20 tablet; Refill: 1  -     predniSONE (DELTASONE) 20 MG tablet; Take 1 tablet (20 mg total) by mouth 2 (two) times daily. for 4 days  Dispense: 8 tablet; Refill: 0  -    "  methocarbamoL (ROBAXIN) 750 MG Tab; Take 1 tablet (750 mg total) by mouth nightly as needed (pain/spasms).  Dispense: 20 tablet; Refill: 1    2. Reactive depression  Assessment & Plan:   PHQ-9 score 11, ZAIRE-7 score 6.  Patient state depression has been going on for quite some time which has been affecting his sleep.  Previously patient has mentioned this issue in the past but he declined any type of management.  Today patient requesting treatment.  Some of the triggers both of his parents passed away from drug overdose although he does not seem to have any connection with his parents.  Patient voice him and his mother were not close and he did not want to elaborate more about the relationship.  Patient state, even what happened to his parents he does not believe is the cause of his depression and insomnia.  Patient state he does exercise and tries to eat healthy feels better but at times continue to feel sad.  Patient state" something is there but I do not know what it is".  Patient state I even tried my grandmother trazodone 100 mg p.o. at night and it did not help me fall asleep.  Patient state he never been on any depression medications or medications for insomnia.  Patient state he tried melatonin in the past but it did not help.  Discussed Prozac 10 mg p.o. for 7 days and then to increase to 20 mg once a day until he returns for virtual visit in 6 week.  Patient to read discharge education materials.  Questions solicited and answered, patient verbalized and agreed to plan.    Orders:  -     FLUoxetine 10 MG capsule; Take 1 capsule (10 mg total) by mouth once daily. for 7 days  Dispense: 7 capsule; Refill: 0  -     FLUoxetine 20 MG capsule; Take 1 capsule (20 mg total) by mouth once daily.  Dispense: 30 capsule; Refill: 0    3. Class 3 severe obesity due to excess calories without serious comorbidity with body mass index (BMI) of 50.0 to 59.9 in adult  Assessment & Plan:  Discussed lifestyle modifications and " healthy eating habits.  Continue to exercise.  Discussed calorie control, portion control.  Discussed low-fat, low-cholesterol diet.  Discussed low-salt diet.  Keep fiber intake 30 g per day if possible.  Stay hydrated with water.  Patient to read discharge education materials.  Weight loss should help with lower back pain.  Questions solicited and answered, patient verbalized and agreed to plan.             RESULTS:  No results found for this or any previous visit (from the past 1008 hour(s)).      Follow Up:  Follow up in about 6 weeks (around 8/30/2024) for Virtual Visit.     35  minutes of total time spent on the encounter, which includes face to face time and non-face to face time preparing to see the patient (eg, review of tests), Obtaining and/or reviewing separately obtained history, Documenting clinical information in the electronic or other health record, Independently interpreting results (not separately reported) and communicating results to the patient/family/caregiver, or Care coordination (not separately reported).      Previous medical history/lab work/radiology reviewed and considered during medical management decisions.   Medication list reviewed and medication reconciliation performed.  Patient was provided  and care about his/her current diagnosis (es) and medications including risk/benefit and side effects/adverse events, over the counter medication uses/doses, home self-care and contact precautions,  and red flags and indications for when to seek immediate medical attention.   Patient was advised to continue compliance with current medication list and medical recommendations.  Patient dvised continued compliance with recommended eating habits/ diets for medical conditions and exercise 150 minutes/ week (if possible) for medical condition (s).  Educational handouts and instructions on selected disease management in AVS (After Visit Summary).    All of the patient's questions were answered  to patient's satisfaction.   The patient was receptive, expressed verbal understanding and agreement the above plan.          This note was created with the assistance of a voice recognition software or phone dictation. There may be transcription errors as a result of using this technology however minimal. Effort has been made to assure accuracy of transcription but any obvious errors or omissions should be clarified with the author of the document

## 2024-07-16 NOTE — ASSESSMENT & PLAN NOTE
Symptom started about a week ago.   Patient state since he started exercising bilateral low back pain right side is greater than left.   Pain is 8/10.  Patient state he is uncomfortable going to bed due to pain.  Patient state right-sided pain radiate to right lower extremity.  Denies any urinary incontinence or bowel habit changes.  Discussed treatment as follow:  Rx prednisone 20 mg p.o. twice a day 1 tab with breakfast the 2nd dose with lunch.  Stay hydrated with water.  Rx Robaxin 750 mg 1-2 tab at bedtime as needed for back pain and spasm.  Rx diclofenac 75 mg p.o. b.i.d. as needed for pain, take with food, do not take with any other NSAIDs such as Advil, ibuprofen, Aleve, naproxen, Motrin.  Stay hydrated with water.  Rest.  Alternate between heat and ice.  Daily stretching.  Weight loss.  Healthy eating habits and lifestyle modifications.  Patient to read discharge education materials.  Return to clinic if no improvement.

## 2024-07-16 NOTE — ASSESSMENT & PLAN NOTE
Discussed lifestyle modifications and healthy eating habits.  Continue to exercise.  Discussed calorie control, portion control.  Discussed low-fat, low-cholesterol diet.  Discussed low-salt diet.  Keep fiber intake 30 g per day if possible.  Stay hydrated with water.  Patient to read discharge education materials.  Weight loss should help with lower back pain.  Questions solicited and answered, patient verbalized and agreed to plan.

## 2024-07-16 NOTE — ASSESSMENT & PLAN NOTE
" PHQ-9 score 11, ZAIRE-7 score 6.  Patient state depression has been going on for quite some time which has been affecting his sleep.  Previously patient has mentioned this issue in the past but he declined any type of management.  Today patient requesting treatment.  Some of the triggers both of his parents passed away from drug overdose although he does not seem to have any connection with his parents.  Patient voice him and his mother were not close and he did not want to elaborate more about the relationship.  Patient state, even what happened to his parents he does not believe is the cause of his depression and insomnia.  Patient state he does exercise and tries to eat healthy feels better but at times continue to feel sad.  Patient state" something is there but I do not know what it is".  Patient state I even tried my grandmother trazodone 100 mg p.o. at night and it did not help me fall asleep.  Patient state he never been on any depression medications or medications for insomnia.  Patient state he tried melatonin in the past but it did not help.  Discussed Prozac 10 mg p.o. for 7 days and then to increase to 20 mg once a day until he returns for virtual visit in 6 week.  Patient to read discharge education materials.  Questions solicited and answered, patient verbalized and agreed to plan.  "

## 2024-08-22 DIAGNOSIS — F32.9 REACTIVE DEPRESSION: ICD-10-CM

## 2024-08-22 RX ORDER — FLUOXETINE HYDROCHLORIDE 20 MG/1
CAPSULE ORAL
Qty: 30 CAPSULE | Refills: 0 | Status: SHIPPED | OUTPATIENT
Start: 2024-08-22

## 2024-09-06 ENCOUNTER — TELEPHONE (OUTPATIENT)
Dept: FAMILY MEDICINE | Facility: CLINIC | Age: 29
End: 2024-09-06
Payer: MEDICAID

## 2024-09-06 NOTE — TELEPHONE ENCOUNTER
Attempted to call patient regarding telemedicine visit. There was no answer. Left voicemail to call back.

## 2024-09-20 ENCOUNTER — OFFICE VISIT (OUTPATIENT)
Dept: FAMILY MEDICINE | Facility: CLINIC | Age: 29
End: 2024-09-20
Payer: MEDICAID

## 2024-09-20 DIAGNOSIS — F33.41 RECURRENT MAJOR DEPRESSIVE DISORDER, IN PARTIAL REMISSION: Primary | ICD-10-CM

## 2024-09-20 RX ORDER — FLUOXETINE HYDROCHLORIDE 40 MG/1
40 CAPSULE ORAL DAILY
Qty: 30 CAPSULE | Refills: 2 | Status: SHIPPED | OUTPATIENT
Start: 2024-09-20

## 2024-09-20 NOTE — PROGRESS NOTES
Patient Name: Mello Olmedo   : 1995  MRN: [id number]     SUBJECTIVE DATA:    CHIEF COMPLAINT:   Mello Olmedo is a 29 y.o. male who presents to clinic today with Depression        HPI 29-year-old male presents to the clinic to follow-up on depression via virtual visit.    The patient location is: [address] [address]   The chief complaint leading to consultation is: Depression    Visit type: audiovisual    Face to Face time with patient: 15  20 minutes of total time spent on the encounter, which includes face to face time and non-face to face time preparing to see the patient (eg, review of tests), Obtaining and/or reviewing separately obtained history, Documenting clinical information in the electronic or other health record, Independently interpreting results (not separately reported) and communicating results to the patient/family/caregiver, or Care coordination (not separately reported).         Each patient to whom he or she provides medical services by telemedicine is:  (1) informed of the relationship between the physician and patient and the respective role of any other health care provider with respect to management of the patient; and (2) notified that he or she may decline to receive medical services by telemedicine and may withdraw from such care at any time.    Notes:   2024  Depression: PHQ-9 score 11, ZAIRE-7 score 6.  Patient state depression has been going on for quite some time which has been affecting his sleep.  Previously patient has mentioned this issue in the past but he declined any type of management.  Today patient requesting treatment.  Some of the triggers both of his parents passed away from drug overdose although he does not seem to have any connection with his parents.  Patient voice him and his mother were not close and he did not want to elaborate more about the relationship.  Patient state, even what happened to his parents he does not believe is the  "cause of his depression and insomnia.  Patient state he does exercise and tries to eat healthy feels better but at times continue to feel sad.  Patient state" something is there but I do not know what it is".  Patient state I even tried my grandmother trazodone 100 mg p.o. at night and it did not help me fall asleep.  Patient state he never been on any depression medications or medications for insomnia.  Patient state he tried melatonin in the past but it did not help.  Discussed Prozac 10 mg p.o. for 7 days and then to increase to 20 mg once a day until he returns for virtual visit in 6 week.  Patient to read discharge education materials.  Questions solicited and answered, patient verbalized and agreed to plan.    09/20/2024  Depression:  PHQ-9 score is 10, jaya-7 score is 2.  Patient currently on Prozac 20 mg p.o. once daily doing well on medication, patient feel much improvement with mood.  Patient state sleeping better.    Rx Prozac 40 mg p.o. once daily sent to preferred pharmacy.  Patient to return to clinic in November for wellness and follow-up on depression.  Advise patient to follow-up sooner if need to.  Questions solicited and answered, patient verbalized and agreed to plan.    Patient denies chest pain, shortness of breath, dyspnea on exertion, palpitations, peripheral edema, abdominal pain, nausea, vomiting, diarrhea, constipation, fatigue, fever, chills, dysuria,  hematuria, dark stools or bloody stools.          ALLERGIES:   Review of patient's allergies indicates:   Allergen Reactions    Penicillins Rash         ROS:  Review of Systems   Psychiatric/Behavioral:  Positive for depression.    All other systems reviewed and are negative.        OBJECTIVE DATA:  Vital signs  There were no vitals filed for this visit.   There is no height or weight on file to calculate BMI.    PHYSICAL EXAM:   Physical Exam  Nursing note reviewed.   Constitutional:       General: He is awake. He is not in acute " distress.     Appearance: Normal appearance. He is well-developed and well-groomed. He is not ill-appearing, toxic-appearing or diaphoretic.   HENT:      Head: Normocephalic and atraumatic.      Nose: Nose normal.   Eyes:      Extraocular Movements: Extraocular movements intact.   Pulmonary:      Effort: Pulmonary effort is normal.   Musculoskeletal:         General: Normal range of motion.      Cervical back: Normal range of motion.   Neurological:      General: No focal deficit present.      Mental Status: He is alert and oriented to person, place, and time. Mental status is at baseline.      GCS: GCS eye subscore is 4. GCS verbal subscore is 5. GCS motor subscore is 6.   Psychiatric:         Attention and Perception: Attention and perception normal.         Mood and Affect: Mood and affect normal.         Speech: Speech normal.         Behavior: Behavior normal. Behavior is cooperative.         Thought Content: Thought content normal. Thought content does not include homicidal or suicidal ideation.         Cognition and Memory: Cognition and memory normal.          ASSESSMENT/PLAN:  1. Recurrent major depressive disorder, in partial remission  Assessment & Plan:  PHQ-9 score is 10, jaya-7 score is 2.  Patient currently on Prozac 20 mg p.o. once daily doing well on medication, patient feel much improvement with mood.  Patient state sleeping better.    Rx Prozac 40 mg p.o. once daily sent to preferred pharmacy.  Patient to return to clinic in November for wellness and follow-up on depression.  Advise patient to follow-up sooner if need to.  Questions solicited and answered, patient verbalized and agreed to plan.    Orders:  -     FLUoxetine 40 MG capsule; Take 1 capsule (40 mg total) by mouth once daily.  Dispense: 30 capsule; Refill: 2           RESULTS:  No results found for this or any previous visit (from the past 1008 hour(s)).      Follow Up:  Follow up in about 2 months (around 11/25/2024).     20 minutes of  total time spent on the encounter, which includes face to face time and non-face to face time preparing to see the patient (eg, review of tests), Obtaining and/or reviewing separately obtained history, Documenting clinical information in the electronic or other health record, Independently interpreting results (not separately reported) and communicating results to the patient/family/caregiver, or Care coordination (not separately reported).      Previous medical history/lab work/radiology reviewed and considered during medical management decisions.   Medication list reviewed and medication reconciliation performed.  Patient was provided  and care about his/her current diagnosis (es) and medications including risk/benefit and side effects/adverse events, over the counter medication uses/doses, home self-care and contact precautions,  and red flags and indications for when to seek immediate medical attention.   Patient was advised to continue compliance with current medication list and medical recommendations.  Patient dvised continued compliance with recommended eating habits/ diets for medical conditions and exercise 150 minutes/ week (if possible) for medical condition (s).  Educational handouts and instructions on selected disease management in AVS (After Visit Summary).    All of the patient's questions were answered to patient's satisfaction.   The patient was receptive, expressed verbal understanding and agreement the above plan.        This note was created with the assistance of a voice recognition software or phone dictation. There may be transcription errors as a result of using this technology however minimal. Effort has been made to assure accuracy of transcription but any obvious errors or omissions should be clarified with the author of the document

## 2024-09-20 NOTE — ASSESSMENT & PLAN NOTE
PHQ-9 score is 10, jaya-7 score is 2.  Patient currently on Prozac 20 mg p.o. once daily doing well on medication, patient feel much improvement with mood.  Patient state sleeping better.    Rx Prozac 40 mg p.o. once daily sent to preferred pharmacy.  Patient to return to clinic in November for wellness and follow-up on depression.  Advise patient to follow-up sooner if need to.  Questions solicited and answered, patient verbalized and agreed to plan.

## 2024-11-21 ENCOUNTER — OFFICE VISIT (OUTPATIENT)
Dept: FAMILY MEDICINE | Facility: CLINIC | Age: 29
End: 2024-11-21
Payer: MEDICAID

## 2024-11-21 VITALS
TEMPERATURE: 99 F | OXYGEN SATURATION: 97 % | SYSTOLIC BLOOD PRESSURE: 118 MMHG | HEART RATE: 50 BPM | WEIGHT: 304 LBS | HEIGHT: 65 IN | DIASTOLIC BLOOD PRESSURE: 80 MMHG | RESPIRATION RATE: 18 BRPM | BODY MASS INDEX: 50.65 KG/M2

## 2024-11-21 DIAGNOSIS — R00.1 BRADYCARDIA: ICD-10-CM

## 2024-11-21 DIAGNOSIS — K21.9 GASTROESOPHAGEAL REFLUX DISEASE WITHOUT ESOPHAGITIS: Primary | ICD-10-CM

## 2024-11-21 DIAGNOSIS — Z00.00 ANNUAL PHYSICAL EXAM: ICD-10-CM

## 2024-11-21 DIAGNOSIS — E66.813 CLASS 3 SEVERE OBESITY DUE TO EXCESS CALORIES WITHOUT SERIOUS COMORBIDITY WITH BODY MASS INDEX (BMI) OF 50.0 TO 59.9 IN ADULT: ICD-10-CM

## 2024-11-21 DIAGNOSIS — F33.41 RECURRENT MAJOR DEPRESSIVE DISORDER, IN PARTIAL REMISSION: ICD-10-CM

## 2024-11-21 DIAGNOSIS — E66.01 CLASS 3 SEVERE OBESITY DUE TO EXCESS CALORIES WITHOUT SERIOUS COMORBIDITY WITH BODY MASS INDEX (BMI) OF 50.0 TO 59.9 IN ADULT: ICD-10-CM

## 2024-11-21 LAB
ALBUMIN SERPL-MCNC: 4 G/DL (ref 3.5–5)
ALBUMIN/GLOB SERPL: 1 RATIO (ref 1.1–2)
ALP SERPL-CCNC: 79 UNIT/L (ref 40–150)
ALT SERPL-CCNC: 23 UNIT/L (ref 0–55)
ANION GAP SERPL CALC-SCNC: 6 MEQ/L
AST SERPL-CCNC: 15 UNIT/L (ref 5–34)
BACTERIA #/AREA URNS AUTO: ABNORMAL /HPF
BASOPHILS # BLD AUTO: 0.03 X10(3)/MCL
BASOPHILS NFR BLD AUTO: 0.4 %
BILIRUB SERPL-MCNC: 0.3 MG/DL
BILIRUB UR QL STRIP.AUTO: NEGATIVE
BUN SERPL-MCNC: 12.1 MG/DL (ref 8.9–20.6)
CALCIUM SERPL-MCNC: 9.6 MG/DL (ref 8.4–10.2)
CHLORIDE SERPL-SCNC: 103 MMOL/L (ref 98–107)
CLARITY UR: CLEAR
CO2 SERPL-SCNC: 30 MMOL/L (ref 22–29)
COLOR UR AUTO: YELLOW
CREAT SERPL-MCNC: 0.97 MG/DL (ref 0.72–1.25)
CREAT/UREA NIT SERPL: 12
EOSINOPHIL # BLD AUTO: 0.05 X10(3)/MCL (ref 0–0.9)
EOSINOPHIL NFR BLD AUTO: 0.6 %
ERYTHROCYTE [DISTWIDTH] IN BLOOD BY AUTOMATED COUNT: 14.7 % (ref 11.5–17)
GFR SERPLBLD CREATININE-BSD FMLA CKD-EPI: >60 ML/MIN/1.73/M2
GLOBULIN SER-MCNC: 3.9 GM/DL (ref 2.4–3.5)
GLUCOSE SERPL-MCNC: 84 MG/DL (ref 74–100)
GLUCOSE UR QL STRIP: NORMAL
HCT VFR BLD AUTO: 43.6 % (ref 42–52)
HGB BLD-MCNC: 13.7 G/DL (ref 14–18)
HGB UR QL STRIP: NEGATIVE
HYALINE CASTS #/AREA URNS LPF: ABNORMAL /LPF
IMM GRANULOCYTES # BLD AUTO: 0.02 X10(3)/MCL (ref 0–0.04)
IMM GRANULOCYTES NFR BLD AUTO: 0.3 %
KETONES UR QL STRIP: NEGATIVE
LEUKOCYTE ESTERASE UR QL STRIP: NEGATIVE
LYMPHOCYTES # BLD AUTO: 1.76 X10(3)/MCL (ref 0.6–4.6)
LYMPHOCYTES NFR BLD AUTO: 22.1 %
MCH RBC QN AUTO: 26.3 PG (ref 27–31)
MCHC RBC AUTO-ENTMCNC: 31.4 G/DL (ref 33–36)
MCV RBC AUTO: 83.8 FL (ref 80–94)
MONOCYTES # BLD AUTO: 0.46 X10(3)/MCL (ref 0.1–1.3)
MONOCYTES NFR BLD AUTO: 5.8 %
MUCOUS THREADS URNS QL MICRO: ABNORMAL /LPF
NEUTROPHILS # BLD AUTO: 5.65 X10(3)/MCL (ref 2.1–9.2)
NEUTROPHILS NFR BLD AUTO: 70.8 %
NITRITE UR QL STRIP: NEGATIVE
NRBC BLD AUTO-RTO: 0 %
PH UR STRIP: 7 [PH]
PLATELET # BLD AUTO: 311 X10(3)/MCL (ref 130–400)
PMV BLD AUTO: 11 FL (ref 7.4–10.4)
POTASSIUM SERPL-SCNC: 4.1 MMOL/L (ref 3.5–5.1)
PROT SERPL-MCNC: 7.9 GM/DL (ref 6.4–8.3)
PROT UR QL STRIP: ABNORMAL
RBC # BLD AUTO: 5.2 X10(6)/MCL (ref 4.7–6.1)
RBC #/AREA URNS AUTO: ABNORMAL /HPF
SODIUM SERPL-SCNC: 139 MMOL/L (ref 136–145)
SP GR UR STRIP.AUTO: 1.03 (ref 1–1.03)
SQUAMOUS #/AREA URNS LPF: ABNORMAL /HPF
TSH SERPL-ACNC: 1.97 UIU/ML (ref 0.35–4.94)
UROBILINOGEN UR STRIP-ACNC: NORMAL
WBC # BLD AUTO: 7.97 X10(3)/MCL (ref 4.5–11.5)
WBC #/AREA URNS AUTO: ABNORMAL /HPF

## 2024-11-21 PROCEDURE — 85025 COMPLETE CBC W/AUTO DIFF WBC: CPT | Performed by: NURSE PRACTITIONER

## 2024-11-21 PROCEDURE — 99215 OFFICE O/P EST HI 40 MIN: CPT | Mod: PBBFAC,25 | Performed by: NURSE PRACTITIONER

## 2024-11-21 PROCEDURE — 93005 ELECTROCARDIOGRAM TRACING: CPT

## 2024-11-21 PROCEDURE — 81001 URINALYSIS AUTO W/SCOPE: CPT | Performed by: NURSE PRACTITIONER

## 2024-11-21 PROCEDURE — 84443 ASSAY THYROID STIM HORMONE: CPT | Performed by: NURSE PRACTITIONER

## 2024-11-21 PROCEDURE — 80053 COMPREHEN METABOLIC PANEL: CPT | Performed by: NURSE PRACTITIONER

## 2024-11-21 PROCEDURE — 36415 COLL VENOUS BLD VENIPUNCTURE: CPT | Performed by: NURSE PRACTITIONER

## 2024-11-21 RX ORDER — OMEPRAZOLE 40 MG/1
40 CAPSULE, DELAYED RELEASE ORAL EVERY MORNING
Qty: 30 CAPSULE | Refills: 5 | Status: SHIPPED | OUTPATIENT
Start: 2024-11-21

## 2024-11-21 RX ORDER — OMEPRAZOLE 40 MG/1
40 CAPSULE, DELAYED RELEASE ORAL EVERY MORNING
Qty: 30 CAPSULE | Refills: 5 | Status: CANCELLED | OUTPATIENT
Start: 2024-11-21

## 2024-11-21 RX ORDER — FLUOXETINE HYDROCHLORIDE 40 MG/1
40 CAPSULE ORAL DAILY
Qty: 30 CAPSULE | Refills: 6 | Status: SHIPPED | OUTPATIENT
Start: 2024-11-21

## 2024-11-21 NOTE — ASSESSMENT & PLAN NOTE
Patient requesting medication refill on omeprazole 40 mg p.o. daily.  Advise patient the importance of weight loss and dietary habit changes.  Patient to read discharge education material GERD diet.  Patient did not go to his appointment with Dr. Vides.  Patient agreed to be referred back.  Referral re-initiated.  Patient verbalized understanding.

## 2024-11-21 NOTE — ASSESSMENT & PLAN NOTE
Coincidental finding during vital signs.  Asymptomatic.  Patient denies any weakness or dizziness or headaches or fatigue or short of breath or chest pains.  Discussed with patient possible induced by Prozac and would like to complete ECG.  Patient agreed.  ECG shows sinus bradycardia with sinus arrhythmia otherwise normal EKG.  No QT prolongation.  Patient to notify the clinic if he started having symptoms.  Patient would like to continue same medication.

## 2024-11-21 NOTE — PROGRESS NOTES
"Patient Name: Mello Olmedo   : 1995  MRN: [id number]     SUBJECTIVE DATA:    CHIEF COMPLAINT:   Mello Olmedo is a 29 y.o. male who presents to clinic today with Annual Exam        HPI:  29-year-old male presents to the clinic to complete yearly wellness exam with labs as well to follow-up on depression.    Notes:   2024  Depression: PHQ-9 score 11, ZAIRE-7 score 6.  Patient state depression has been going on for quite some time which has been affecting his sleep.  Previously patient has mentioned this issue in the past but he declined any type of management.  Today patient requesting treatment.  Some of the triggers both of his parents passed away from drug overdose although he does not seem to have any connection with his parents.  Patient voice him and his mother were not close and he did not want to elaborate more about the relationship.  Patient state, even what happened to his parents he does not believe is the cause of his depression and insomnia.  Patient state he does exercise and tries to eat healthy feels better but at times continue to feel sad.  Patient state" something is there but I do not know what it is".  Patient state I even tried my grandmother trazodone 100 mg p.o. at night and it did not help me fall asleep.  Patient state he never been on any depression medications or medications for insomnia.  Patient state he tried melatonin in the past but it did not help.  Discussed Prozac 10 mg p.o. for 7 days and then to increase to 20 mg once a day until he returns for virtual visit in 6 week.  Patient to read discharge education materials.  Questions solicited and answered, patient verbalized and agreed to plan.     2024  Depression:  PHQ-9 score is 10, zaire-7 score is 2.  Patient currently on Prozac 20 mg p.o. once daily doing well on medication, patient feel much improvement with mood.  Patient state sleeping better.    Rx Prozac 40 mg p.o. once daily sent to preferred pharmacy.  Patient " to return to clinic in November for wellness and follow-up on depression.  Advise patient to follow-up sooner if need to.  Questions solicited and answered, patient verbalized and agreed to plan.    11/21/2024:  Depression:  PHQ -2 score is 2.  Denies anxiety.  Patient compliant with Prozac 40 mg p.o. once daily.  Patient is benefitting from treatment and would like to continue with same dose.  Prescription sent to preferred pharmacy.  Patient is scheduled to return to clinic in 6 months or sooner if needed.  Patient verbalized understanding.    Bradycardia:  Coincidental finding during vital signs.  Asymptomatic.  Patient denies any weakness or dizziness or headaches or fatigue or short of breath or chest pains.  Discussed with patient possible induced by Prozac and would like to complete ECG.  Patient agreed.  ECG shows sinus bradycardia with sinus arrhythmia otherwise normal EKG.  No QT prolongation.  Ventilation rate 56 beats per minute.  Patient to notify the clinic if he started having symptoms.  Patient would like to continue same medication.          Wellness:  Social Drivers of Health     Tobacco Use: Medium Risk (11/21/2024)    Patient History     Smoking Tobacco Use: Former     Smokeless Tobacco Use: Never     Passive Exposure: Past   Alcohol Use: Not At Risk (11/8/2023)    AUDIT-C     Frequency of Alcohol Consumption: 2-4 times a month     Average Number of Drinks: 1 or 2     Frequency of Binge Drinking: Never   Financial Resource Strain: Low Risk  (11/8/2023)    Overall Financial Resource Strain (CARDIA)     Difficulty of Paying Living Expenses: Not hard at all   Food Insecurity: No Food Insecurity (11/8/2023)    Hunger Vital Sign     Worried About Running Out of Food in the Last Year: Never true     Ran Out of Food in the Last Year: Never true   Transportation Needs: No Transportation Needs (11/8/2023)    PRAPARE - Transportation     Lack of Transportation (Medical): No     Lack of Transportation  (Non-Medical): No   Physical Activity: Sufficiently Active (11/8/2023)    Exercise Vital Sign     Days of Exercise per Week: 5 days     Minutes of Exercise per Session: 40 min   Recent Concern: Physical Activity - Insufficiently Active (11/8/2023)    Exercise Vital Sign     Days of Exercise per Week: 3 days     Minutes of Exercise per Session: 40 min   Stress: Stress Concern Present (11/8/2023)    [employer] - Occupational Stress Questionnaire     Feeling of Stress : To some extent   Housing Stability: Low Risk  (11/8/2023)    Housing Stability Vital Sign     Unable to Pay for Housing in the Last Year: No     Number of Places Lived in the Last Year: 1     Unstable Housing in the Last Year: No   Depression: Low Risk  (11/21/2024)    Depression     Last PHQ-4: Flowsheet Data: 2   Recent Concern: Depression - High Risk (9/20/2024)    Depression     Last PHQ-4: Flowsheet Data: 10   Utilities: Not At Risk (7/16/2024)    [facility] Utilities     Threatened with loss of utilities: No   Health Literacy: Adequate Health Literacy (7/16/2024)     Health Literacy     Frequency of need for help with medical instructions: Never   Social Isolation: Socially Integrated (7/16/2024)    Social Isolation     Social Isolation: 1   Car safety:  Seat belt used all the time.  Water:  Stay hydrated with fluids, drink 6-8 cups of water daily.  Exercise: exercise 30 minutes a day up to 5 days a week.  Stay active.  Lose weight.  Nutrition:  Follow low-cholesterol, low-fat, low-salt diet.  Eat fresh fruits and vegetables.  Keep in mind portion size.  Dental:  Patient does not follow-up with a dentist yearly.  Dentist list provided for patient to call and schedule  Ophthalmology:  Patient does follow-up with ophthalmologist yearly.  Immunizations:  Up-to-date  Fasting blood work drawn today will notify of test results when they become available.     Patient was given crackers and juice to eat prior to  "discharge.    Patient denies chest pain, shortness of breath, dyspnea on exertion, palpitations, peripheral edema, abdominal pain, nausea, vomiting, diarrhea, constipation, fatigue, fever, chills, dysuria,  hematuria, dark stool or bloody stools.        ALLERGIES:   Review of patient's allergies indicates:   Allergen Reactions    Penicillins Rash         ROS:  Review of Systems   Psychiatric/Behavioral:  Positive for depression.    All other systems reviewed and are negative.        OBJECTIVE DATA:  Vital signs  Vitals:    11/21/24 1331   BP: 118/80   Pulse: (!) 50   Resp: 18   Temp: 98.5 °F (36.9 °C)   TempSrc: Oral   SpO2: 97%   Weight: (!) 137.9 kg (304 lb)   Height: 5' 5" (1.651 m)      Body mass index is 50.59 kg/m².    PHYSICAL EXAM:   Physical Exam  Vitals and nursing note reviewed.   Constitutional:       General: He is not in acute distress.     Appearance: Normal appearance. He is normal weight. He is not ill-appearing, toxic-appearing or diaphoretic.   HENT:      Head: Normocephalic and atraumatic.      Right Ear: Tympanic membrane, ear canal and external ear normal.      Left Ear: Tympanic membrane, ear canal and external ear normal.      Nose: Nose normal.      Mouth/Throat:      Mouth: Mucous membranes are moist.      Pharynx: Oropharynx is clear.   Eyes:      General: Lids are normal. Gaze aligned appropriately.      Extraocular Movements: Extraocular movements intact.      Conjunctiva/sclera: Conjunctivae normal.      Pupils: Pupils are equal, round, and reactive to light.   Neck:      Trachea: Trachea and phonation normal.   Cardiovascular:      Rate and Rhythm: Regular rhythm. Bradycardia present.      Pulses: Normal pulses.           Carotid pulses are 2+ on the right side and 2+ on the left side.       Radial pulses are 2+ on the right side and 2+ on the left side.        Femoral pulses are 2+ on the right side and 2+ on the left side.       Dorsalis pedis pulses are 2+ on the right side and 2+ " on the left side.        Posterior tibial pulses are 2+ on the right side and 2+ on the left side.      Heart sounds: Normal heart sounds.   Pulmonary:      Effort: Pulmonary effort is normal.      Breath sounds: Normal breath sounds and air entry.   Abdominal:      General: Abdomen is flat. Bowel sounds are normal. There is no distension.      Palpations: Abdomen is soft. There is no mass.      Tenderness: There is no abdominal tenderness. There is no right CVA tenderness, left CVA tenderness, guarding or rebound.   Genitourinary:     Comments: Exam deferred.  Denies any urinary symptoms.  Musculoskeletal:         General: Normal range of motion.      Cervical back: Normal range of motion and neck supple. No rigidity or tenderness.      Right lower leg: No edema.      Left lower leg: No edema.   Lymphadenopathy:      Cervical: No cervical adenopathy.   Skin:     General: Skin is warm.      Capillary Refill: Capillary refill takes less than 2 seconds.      Findings: No rash.   Neurological:      General: No focal deficit present.      Mental Status: He is alert and oriented to person, place, and time. Mental status is at baseline.      GCS: GCS eye subscore is 4. GCS verbal subscore is 5. GCS motor subscore is 6.      Cranial Nerves: Cranial nerves 2-12 are intact. No cranial nerve deficit.      Sensory: Sensation is intact. No sensory deficit.      Motor: Motor function is intact. No weakness.      Coordination: Coordination is intact. Coordination normal.      Gait: Gait is intact. Gait normal.   Psychiatric:         Attention and Perception: Attention and perception normal.         Mood and Affect: Mood and affect normal.         Behavior: Behavior normal. Behavior is cooperative.         Thought Content: Thought content normal.         Cognition and Memory: Cognition and memory normal.         Judgment: Judgment normal.          ASSESSMENT/PLAN:  1. Gastroesophageal reflux disease without esophagitis  Assessment  & Plan:  Patient requesting medication refill on omeprazole 40 mg p.o. daily.  Advise patient the importance of weight loss and dietary habit changes.  Patient to read discharge education material GERD diet.  Patient did not go to his appointment with Dr. Vides.  Patient agreed to be referred back.  Referral re-initiated.  Patient verbalized understanding.    Orders:  -     omeprazole (PRILOSEC) 40 MG capsule; Take 1 capsule (40 mg total) by mouth every morning.  Dispense: 30 capsule; Refill: 5  -     Ambulatory referral/consult to Gastroenterology; Future; Expected date: 11/28/2024    2. Annual physical exam  -     CBC Auto Differential  -     Comprehensive Metabolic Panel  -     TSH  -     Urinalysis, Reflex to Urine Culture    3. Recurrent major depressive disorder, in partial remission  Assessment & Plan:  PHQ -2 score is 2.  Denies anxiety.  Patient compliant with Prozac 40 mg p.o. once daily.  Patient is benefitting from treatment and would like to continue with same dose.  Prescription sent to preferred pharmacy.  Patient is scheduled to return to clinic in 6 months or sooner if needed.  Patient verbalized understanding.    Orders:  -     FLUoxetine 40 MG capsule; Take 1 capsule (40 mg total) by mouth once daily.  Dispense: 30 capsule; Refill: 6    4. Bradycardia  Assessment & Plan:  Coincidental finding during vital signs.  Asymptomatic.  Patient denies any weakness or dizziness or headaches or fatigue or short of breath or chest pains.  Discussed with patient possible induced by Prozac and would like to complete ECG.  Patient agreed.  ECG shows sinus bradycardia with sinus arrhythmia otherwise normal EKG.  No QT prolongation.  Patient to notify the clinic if he started having symptoms.  Patient would like to continue same medication.    Orders:  -     EKG 12-lead    5. Class 3 severe obesity due to excess calories without serious comorbidity with body mass index (BMI) of 50.0 to 59.9 in adult  Assessment  & Plan:  Discussed with patient the importance of weight loss.  Patient to [facility] loss program.  Patient agreed.  Patient read discharge instructed materials that were provided to him from last visit and he is aware that he needs to lose weight.  Phone number provided for patient to call and set up [email or web address] for weight loss.             RESULTS:  No results found for this or any previous visit (from the past 6 weeks).      Follow Up:  Follow up in about 6 months (around 5/21/2025).     45 minutes of total time spent on the encounter, which includes face to face time and non-face to face time preparing to see the patient (eg, review of tests), Obtaining and/or reviewing separately obtained history, Documenting clinical information in the electronic or other health record, Independently interpreting results (not separately reported) and communicating results to the patient/family/caregiver, or Care coordination (not separately reported).        Previous medical history/lab work/radiology reviewed and considered during medical management decisions.   Medication list reviewed and medication reconciliation performed.  Patient was provided  and care about his/her current diagnosis (es) and medications including risk/benefit and side effects/adverse events, over the counter medication uses/doses, home self-care and contact precautions,  and red flags and indications for when to seek immediate medical attention.   Patient was advised to continue compliance with current medication list and medical recommendations.  Patient dvised continued compliance with recommended eating habits/ diets for medical conditions and exercise 150 minutes/ week (if possible) for medical condition (s).  Educational handouts and instructions on selected disease management in AVS (After Visit Summary).    All of the patient's questions were answered to patient's satisfaction.   The patient was  receptive, expressed verbal understanding and agreement the above plan.        This note was created with the assistance of a voice recognition software or phone dictation. There may be transcription errors as a result of using this technology however minimal. Effort has been made to assure accuracy of transcription but any obvious errors or omissions should be clarified with the author of the document

## 2024-11-21 NOTE — ASSESSMENT & PLAN NOTE
Discussed with patient the importance of weight loss.  Patient to [facility] weight loss program.  Patient agreed.  Patient read discharge instructed materials that were provided to him from last visit and he is aware that he needs to lose weight.  Phone number provided for patient to call and set up [facility] for weight loss.

## 2024-11-21 NOTE — ASSESSMENT & PLAN NOTE
PHQ -2 score is 2.  Denies anxiety.  Patient compliant with Prozac 40 mg p.o. once daily.  Patient is benefitting from treatment and would like to continue with same dose.  Prescription sent to preferred pharmacy.  Patient is scheduled to return to clinic in 6 months or sooner if needed.  Patient verbalized understanding.

## 2024-11-22 LAB
OHS QRS DURATION: 82 MS
OHS QTC CALCULATION: 401 MS

## 2024-11-22 NOTE — PROGRESS NOTES
PLEASE CALL PATIENTS WITH RESULTS,  Electrolytes, kidney functions, liver functions within normal range.    Urinalysis no sign of infection, stay hydrated with water.    Blood count no sign of infection or anemia.    Thyroid function test within normal range.  Keep appointment with gastro.  Return to clinic as discussed or sooner if needed.  Happy holidays

## 2024-11-25 ENCOUNTER — TELEPHONE (OUTPATIENT)
Dept: FAMILY MEDICINE | Facility: CLINIC | Age: 29
End: 2024-11-25
Payer: MEDICAID

## 2024-11-25 NOTE — TELEPHONE ENCOUNTER
1st attempt to call patient no answer left voicemail.       ----- Message from ANDREA Adams sent at 11/22/2024  2:59 PM CST -----  PLEASE CALL PATIENTS WITH RESULTS,  Electrolytes, kidney functions, liver functions within normal range.    Urinalysis no sign of infection, stay hydrated with water.    Blood count no sign of infection or anemia.    Thyroid function test within normal range.  Keep appointment with gastro.  Return to clinic as discussed or sooner if needed.  Happy holidays

## 2024-11-26 ENCOUNTER — TELEPHONE (OUTPATIENT)
Dept: FAMILY MEDICINE | Facility: CLINIC | Age: 29
End: 2024-11-26
Payer: MEDICAID

## 2024-11-26 NOTE — TELEPHONE ENCOUNTER
Called patient to give results. Patient verbalized understanding. No additional questions at this time.     ----- Message from ANDREA Adams sent at 11/22/2024  2:59 PM CST -----  PLEASE CALL PATIENTS WITH RESULTS,  Electrolytes, kidney functions, liver functions within normal range.    Urinalysis no sign of infection, stay hydrated with water.    Blood count no sign of infection or anemia.    Thyroid function test within normal range.  Keep appointment with gastro.  Return to clinic as discussed or sooner if needed.  Happy holidays

## 2025-01-02 DIAGNOSIS — K21.9 GASTROESOPHAGEAL REFLUX DISEASE WITHOUT ESOPHAGITIS: ICD-10-CM

## 2025-01-02 DIAGNOSIS — M54.41 ACUTE RIGHT-SIDED LOW BACK PAIN WITH RIGHT-SIDED SCIATICA: ICD-10-CM

## 2025-01-06 RX ORDER — METHOCARBAMOL 750 MG/1
750 TABLET, FILM COATED ORAL NIGHTLY PRN
Qty: 20 TABLET | Refills: 3 | Status: SHIPPED | OUTPATIENT
Start: 2025-01-06 | End: 2026-01-06

## 2025-01-06 RX ORDER — OMEPRAZOLE 40 MG/1
40 CAPSULE, DELAYED RELEASE ORAL EVERY MORNING
Qty: 30 CAPSULE | Refills: 5 | Status: SHIPPED | OUTPATIENT
Start: 2025-01-06 | End: 2026-01-06

## 2025-01-06 RX ORDER — DICLOFENAC SODIUM 75 MG/1
75 TABLET, DELAYED RELEASE ORAL 2 TIMES DAILY PRN
Qty: 20 TABLET | Refills: 3 | Status: SHIPPED | OUTPATIENT
Start: 2025-01-06 | End: 2026-01-06